# Patient Record
Sex: FEMALE | Race: WHITE | Employment: OTHER | URBAN - METROPOLITAN AREA
[De-identification: names, ages, dates, MRNs, and addresses within clinical notes are randomized per-mention and may not be internally consistent; named-entity substitution may affect disease eponyms.]

---

## 2016-10-03 LAB — LDL-C, EXTERNAL: 98.4

## 2017-01-01 ENCOUNTER — HOSPITAL ENCOUNTER (OUTPATIENT)
Dept: MAMMOGRAPHY | Age: 72
Discharge: HOME | End: 2017-12-13
Attending: INTERNAL MEDICINE
Payer: MEDICARE

## 2017-01-01 ENCOUNTER — OFFICE VISIT (OUTPATIENT)
Dept: CARDIOLOGY CLINIC | Age: 72
End: 2017-01-01

## 2017-01-01 ENCOUNTER — OFFICE VISIT (OUTPATIENT)
Dept: INTERNAL MEDICINE CLINIC | Age: 72
End: 2017-01-01

## 2017-01-01 ENCOUNTER — TELEPHONE (OUTPATIENT)
Dept: INTERNAL MEDICINE CLINIC | Age: 72
End: 2017-01-01

## 2017-01-01 ENCOUNTER — HOSPITAL ENCOUNTER (OUTPATIENT)
Dept: LAB | Age: 72
Discharge: HOME | End: 2017-11-17
Payer: MEDICARE

## 2017-01-01 ENCOUNTER — HOSPITAL ENCOUNTER (OUTPATIENT)
Dept: LAB | Age: 72
Discharge: HOME | End: 2017-11-20
Payer: MEDICARE

## 2017-01-01 ENCOUNTER — HOSPITAL ENCOUNTER (OUTPATIENT)
Dept: ULTRASOUND IMAGING | Age: 72
Discharge: HOME | End: 2017-11-27
Attending: INTERNAL MEDICINE
Payer: MEDICARE

## 2017-01-01 VITALS
OXYGEN SATURATION: 100 % | DIASTOLIC BLOOD PRESSURE: 56 MMHG | WEIGHT: 119.6 LBS | BODY MASS INDEX: 21.19 KG/M2 | HEIGHT: 63 IN | HEART RATE: 64 BPM | RESPIRATION RATE: 12 BRPM | TEMPERATURE: 98.1 F | SYSTOLIC BLOOD PRESSURE: 124 MMHG

## 2017-01-01 VITALS
RESPIRATION RATE: 12 BRPM | DIASTOLIC BLOOD PRESSURE: 68 MMHG | HEART RATE: 61 BPM | BODY MASS INDEX: 21.16 KG/M2 | OXYGEN SATURATION: 96 % | SYSTOLIC BLOOD PRESSURE: 135 MMHG | WEIGHT: 119.4 LBS | HEIGHT: 63 IN | TEMPERATURE: 96.9 F

## 2017-01-01 VITALS
SYSTOLIC BLOOD PRESSURE: 138 MMHG | DIASTOLIC BLOOD PRESSURE: 64 MMHG | HEART RATE: 70 BPM | BODY MASS INDEX: 21.44 KG/M2 | HEIGHT: 63 IN | WEIGHT: 121 LBS | OXYGEN SATURATION: 98 %

## 2017-01-01 DIAGNOSIS — N17.9 AKI (ACUTE KIDNEY INJURY) (HCC): ICD-10-CM

## 2017-01-01 DIAGNOSIS — I48.3 TYPICAL ATRIAL FLUTTER (HCC): Primary | ICD-10-CM

## 2017-01-01 DIAGNOSIS — M35.00 SJOGREN'S SYNDROME, WITH UNSPECIFIED ORGAN INVOLVEMENT (HCC): ICD-10-CM

## 2017-01-01 DIAGNOSIS — Z87.448 HISTORY OF HEMATURIA: ICD-10-CM

## 2017-01-01 DIAGNOSIS — J20.9 ACUTE BRONCHITIS, UNSPECIFIED ORGANISM: Primary | ICD-10-CM

## 2017-01-01 DIAGNOSIS — R80.9 MICROALBUMINURIA: ICD-10-CM

## 2017-01-01 DIAGNOSIS — K75.4 AUTOIMMUNE HEPATITIS (HCC): ICD-10-CM

## 2017-01-01 DIAGNOSIS — R80.9 MICROALBUMINURIA: Primary | ICD-10-CM

## 2017-01-01 DIAGNOSIS — I48.0 PAROXYSMAL ATRIAL FIBRILLATION (HCC): ICD-10-CM

## 2017-01-01 DIAGNOSIS — Z12.39 BREAST CANCER SCREENING: ICD-10-CM

## 2017-01-01 DIAGNOSIS — M35.1 MIXED CONNECTIVE TISSUE DISEASE (HCC): ICD-10-CM

## 2017-01-01 DIAGNOSIS — R76.8 POSITIVE HEPATITIS C ANTIBODY TEST: ICD-10-CM

## 2017-01-01 LAB
ALBUMIN SERPL-MCNC: 4.3 G/DL (ref 3.4–5)
ALBUMIN/GLOB SERPL: 1.2 {RATIO} (ref 0.8–1.7)
ALP SERPL-CCNC: 60 U/L (ref 45–117)
ALT SERPL-CCNC: 39 U/L (ref 13–56)
ANION GAP SERPL CALC-SCNC: 8 MMOL/L (ref 3–18)
AST SERPL-CCNC: 37 U/L (ref 15–37)
BASOPHILS # BLD: 0 K/UL (ref 0–0.06)
BASOPHILS NFR BLD: 0 % (ref 0–2)
BILIRUB SERPL-MCNC: 0.5 MG/DL (ref 0.2–1)
BUN SERPL-MCNC: 12 MG/DL (ref 7–18)
BUN/CREAT SERPL: 16 (ref 12–20)
CALCIUM SERPL-MCNC: 9.5 MG/DL (ref 8.5–10.1)
CCP IGA+IGG SERPL IA-ACNC: 8 UNITS (ref 0–19)
CENTROMERE B AB SER-ACNC: <0.2 AI (ref 0–0.9)
CHLORIDE SERPL-SCNC: 104 MMOL/L (ref 100–108)
CHROMATIN AB SERPL-ACNC: <0.2 AI (ref 0–0.9)
CO2 SERPL-SCNC: 28 MMOL/L (ref 21–32)
COLLECT DURATION TIME UR: 24 HR
CREAT SERPL-MCNC: 0.73 MG/DL (ref 0.6–1.3)
CRP SERPL-MCNC: <0.3 MG/DL (ref 0–0.3)
DIFFERENTIAL METHOD BLD: ABNORMAL
DSDNA AB SER-ACNC: <1 IU/ML (ref 0–9)
DSDNA AB SER-ACNC: <1 IU/ML (ref 0–9)
ENA JO1 AB SER-ACNC: <0.2 AI (ref 0–0.9)
ENA RNP AB SER-ACNC: 1.6 AI (ref 0–0.9)
ENA RNP AB SER-ACNC: 2.2 AI (ref 0–0.9)
ENA SCL70 AB SER-ACNC: <0.2 AI (ref 0–0.9)
ENA SM AB SER-ACNC: <0.2 AI (ref 0–0.9)
ENA SS-A AB SER-ACNC: 7.8 AI (ref 0–0.9)
ENA SS-B AB SER-ACNC: <0.2 AI (ref 0–0.9)
EOSINOPHIL # BLD: 0.1 K/UL (ref 0–0.4)
EOSINOPHIL NFR BLD: 2 % (ref 0–5)
ERYTHROCYTE [DISTWIDTH] IN BLOOD BY AUTOMATED COUNT: 14.9 % (ref 11.6–14.5)
GLOBULIN SER CALC-MCNC: 3.7 G/DL (ref 2–4)
GLUCOSE SERPL-MCNC: 86 MG/DL (ref 74–99)
HBV SURFACE AG SER QL: <0.1 INDEX
HBV SURFACE AG SER QL: NEGATIVE
HCT VFR BLD AUTO: 40.6 % (ref 35–45)
HCV AB S/CO SERPL IA: >11 S/CO RATIO (ref 0–0.9)
HCV RNA SERPL QL NAA+PROBE: NEGATIVE
HCV RNA SERPL QL NAA+PROBE: NORMAL
HGB BLD-MCNC: 13.1 G/DL (ref 12–16)
LYMPHOCYTES # BLD: 1.2 K/UL (ref 0.9–3.6)
LYMPHOCYTES NFR BLD: 27 % (ref 21–52)
MCH RBC QN AUTO: 29.9 PG (ref 24–34)
MCHC RBC AUTO-ENTMCNC: 32.3 G/DL (ref 31–37)
MCV RBC AUTO: 92.7 FL (ref 74–97)
MONOCYTES # BLD: 0.4 K/UL (ref 0.05–1.2)
MONOCYTES NFR BLD: 9 % (ref 3–10)
NEUTS SEG # BLD: 2.8 K/UL (ref 1.8–8)
NEUTS SEG NFR BLD: 62 % (ref 40–73)
PLATELET # BLD AUTO: 221 K/UL (ref 135–420)
PMV BLD AUTO: 11.8 FL (ref 9.2–11.8)
POTASSIUM SERPL-SCNC: 4 MMOL/L (ref 3.5–5.5)
PROT 24H UR-MRATE: NORMAL MG/24HR
PROT SERPL-MCNC: 8 G/DL (ref 6.4–8.2)
RBC # BLD AUTO: 4.38 M/UL (ref 4.2–5.3)
S PYO AG THROAT QL: NEGATIVE
SEE BELOW, 164869: ABNORMAL
SODIUM SERPL-SCNC: 140 MMOL/L (ref 136–145)
SPECIMEN VOL ?TM UR: 1580 ML
VALID INTERNAL CONTROL?: YES
WBC # BLD AUTO: 4.6 K/UL (ref 4.6–13.2)

## 2017-01-01 PROCEDURE — 86235 NUCLEAR ANTIGEN ANTIBODY: CPT | Performed by: INTERNAL MEDICINE

## 2017-01-01 PROCEDURE — 86200 CCP ANTIBODY: CPT | Performed by: INTERNAL MEDICINE

## 2017-01-01 PROCEDURE — 86225 DNA ANTIBODY NATIVE: CPT | Performed by: INTERNAL MEDICINE

## 2017-01-01 PROCEDURE — 86140 C-REACTIVE PROTEIN: CPT | Performed by: INTERNAL MEDICINE

## 2017-01-01 PROCEDURE — 80053 COMPREHEN METABOLIC PANEL: CPT | Performed by: INTERNAL MEDICINE

## 2017-01-01 PROCEDURE — 84156 ASSAY OF PROTEIN URINE: CPT | Performed by: INTERNAL MEDICINE

## 2017-01-01 PROCEDURE — 86803 HEPATITIS C AB TEST: CPT | Performed by: INTERNAL MEDICINE

## 2017-01-01 PROCEDURE — 77067 SCR MAMMO BI INCL CAD: CPT

## 2017-01-01 PROCEDURE — 87521 HEPATITIS C PROBE&RVRS TRNSC: CPT | Performed by: INTERNAL MEDICINE

## 2017-01-01 PROCEDURE — 85025 COMPLETE CBC W/AUTO DIFF WBC: CPT | Performed by: INTERNAL MEDICINE

## 2017-01-01 PROCEDURE — 36415 COLL VENOUS BLD VENIPUNCTURE: CPT | Performed by: INTERNAL MEDICINE

## 2017-01-01 PROCEDURE — 87340 HEPATITIS B SURFACE AG IA: CPT | Performed by: INTERNAL MEDICINE

## 2017-01-01 PROCEDURE — 77063 BREAST TOMOSYNTHESIS BI: CPT

## 2017-01-01 PROCEDURE — 76770 US EXAM ABDO BACK WALL COMP: CPT

## 2017-01-01 RX ORDER — AZITHROMYCIN 250 MG/1
TABLET, FILM COATED ORAL
Qty: 6 TAB | Refills: 0 | Status: SHIPPED | OUTPATIENT
Start: 2017-01-01 | End: 2018-01-01 | Stop reason: ALTCHOICE

## 2017-01-01 RX ORDER — CODEINE PHOSPHATE AND GUAIFENESIN 10; 100 MG/5ML; MG/5ML
5 SOLUTION ORAL
COMMUNITY
End: 2018-01-01 | Stop reason: ALTCHOICE

## 2017-01-07 ENCOUNTER — NURSE NAVIGATOR (OUTPATIENT)
Dept: INTERNAL MEDICINE CLINIC | Age: 72
End: 2017-01-07

## 2017-01-07 NOTE — PROGRESS NOTES
Episode closed:   Patient admitted to [address] [place] [address], 12/4/16 to 12/4/16 for A-fib with tachycardic ventricular rate. No hospitalization or ED admission post 30 days from discharge of 12/4/16.

## 2017-01-13 ENCOUNTER — HOSPITAL ENCOUNTER (OUTPATIENT)
Dept: LAB | Age: 72
Discharge: HOME | End: 2017-01-13
Payer: MEDICARE

## 2017-01-13 LAB
ALBUMIN SERPL BCP-MCNC: 4 G/DL (ref 3.4–5)
ALBUMIN/GLOB SERPL: 1.2 {RATIO} (ref 0.8–1.7)
ALP SERPL-CCNC: 66 U/L (ref 45–117)
ALT SERPL-CCNC: 42 U/L (ref 13–56)
ANION GAP BLD CALC-SCNC: 9 MMOL/L (ref 3–18)
AST SERPL W P-5'-P-CCNC: 36 U/L (ref 15–37)
BASOPHILS # BLD AUTO: 0 K/UL (ref 0–0.06)
BASOPHILS # BLD: 1 % (ref 0–2)
BILIRUB SERPL-MCNC: 0.5 MG/DL (ref 0.2–1)
BUN SERPL-MCNC: 12 MG/DL (ref 7–18)
BUN/CREAT SERPL: 16 (ref 12–20)
CALCIUM SERPL-MCNC: 9.3 MG/DL (ref 8.5–10.1)
CHLORIDE SERPL-SCNC: 105 MMOL/L (ref 100–108)
CO2 SERPL-SCNC: 28 MMOL/L (ref 21–32)
CREAT SERPL-MCNC: 0.76 MG/DL (ref 0.6–1.3)
DIFFERENTIAL METHOD BLD: NORMAL
EOSINOPHIL # BLD: 0.1 K/UL (ref 0–0.4)
EOSINOPHIL NFR BLD: 2 % (ref 0–5)
ERYTHROCYTE [DISTWIDTH] IN BLOOD BY AUTOMATED COUNT: 14.3 % (ref 11.6–14.5)
GLOBULIN SER CALC-MCNC: 3.3 G/DL (ref 2–4)
GLUCOSE SERPL-MCNC: 88 MG/DL (ref 74–99)
HCT VFR BLD AUTO: 39.3 % (ref 35–45)
HGB BLD-MCNC: 13 G/DL (ref 12–16)
LYMPHOCYTES # BLD AUTO: 21 % (ref 21–52)
LYMPHOCYTES # BLD: 1.3 K/UL (ref 0.9–3.6)
MCH RBC QN AUTO: 30.2 PG (ref 24–34)
MCHC RBC AUTO-ENTMCNC: 33.1 G/DL (ref 31–37)
MCV RBC AUTO: 91.4 FL (ref 74–97)
MONOCYTES # BLD: 0.5 K/UL (ref 0.05–1.2)
MONOCYTES NFR BLD AUTO: 9 % (ref 3–10)
NEUTS SEG # BLD: 4.1 K/UL (ref 1.8–8)
NEUTS SEG NFR BLD AUTO: 67 % (ref 40–73)
PLATELET # BLD AUTO: 186 K/UL (ref 135–420)
PMV BLD AUTO: 11.4 FL (ref 9.2–11.8)
POTASSIUM SERPL-SCNC: 4.3 MMOL/L (ref 3.5–5.5)
PROT SERPL-MCNC: 7.3 G/DL (ref 6.4–8.2)
RBC # BLD AUTO: 4.3 M/UL (ref 4.2–5.3)
SODIUM SERPL-SCNC: 142 MMOL/L (ref 136–145)
TSH SERPL DL<=0.05 MIU/L-ACNC: 0.81 UIU/ML (ref 0.36–3.74)
WBC # BLD AUTO: 6.1 K/UL (ref 4.6–13.2)

## 2017-01-13 PROCEDURE — 84443 ASSAY THYROID STIM HORMONE: CPT | Performed by: INTERNAL MEDICINE

## 2017-01-13 PROCEDURE — 85025 COMPLETE CBC W/AUTO DIFF WBC: CPT | Performed by: INTERNAL MEDICINE

## 2017-01-13 PROCEDURE — 36415 COLL VENOUS BLD VENIPUNCTURE: CPT | Performed by: INTERNAL MEDICINE

## 2017-01-13 PROCEDURE — 80053 COMPREHEN METABOLIC PANEL: CPT | Performed by: INTERNAL MEDICINE

## 2017-01-18 ENCOUNTER — OFFICE VISIT (OUTPATIENT)
Dept: INTERNAL MEDICINE CLINIC | Age: 72
End: 2017-01-18

## 2017-01-18 VITALS
BODY MASS INDEX: 20.62 KG/M2 | OXYGEN SATURATION: 97 % | DIASTOLIC BLOOD PRESSURE: 68 MMHG | RESPIRATION RATE: 12 BRPM | WEIGHT: 116.4 LBS | HEIGHT: 63 IN | HEART RATE: 78 BPM | SYSTOLIC BLOOD PRESSURE: 140 MMHG | TEMPERATURE: 98 F

## 2017-01-18 DIAGNOSIS — I48.3 TYPICAL ATRIAL FLUTTER (HCC): ICD-10-CM

## 2017-01-18 DIAGNOSIS — R31.9 HEMATURIA: ICD-10-CM

## 2017-01-18 DIAGNOSIS — G51.9 FACIAL NEUROPATHY: Primary | ICD-10-CM

## 2017-01-18 DIAGNOSIS — F43.9 STRESS: ICD-10-CM

## 2017-01-18 DIAGNOSIS — K75.4 AUTOIMMUNE HEPATITIS (HCC): ICD-10-CM

## 2017-01-18 NOTE — PATIENT INSTRUCTIONS
Lab Results   Component Value Date/Time    Sodium 142 01/13/2017 11:47 AM    Potassium 4.3 01/13/2017 11:47 AM    Chloride 105 01/13/2017 11:47 AM    CO2 28 01/13/2017 11:47 AM    Anion gap 9 01/13/2017 11:47 AM    Glucose 88 01/13/2017 11:47 AM    BUN 12 01/13/2017 11:47 AM    Creatinine 0.76 01/13/2017 11:47 AM    BUN/Creatinine ratio 16 01/13/2017 11:47 AM    GFR est AA >60 01/13/2017 11:47 AM    GFR est non-AA >60 01/13/2017 11:47 AM    Calcium 9.3 01/13/2017 11:47 AM    Bilirubin, total 0.5 01/13/2017 11:47 AM    ALT 42 01/13/2017 11:47 AM    AST 36 01/13/2017 11:47 AM    Alk. phosphatase 66 01/13/2017 11:47 AM    Protein, total 7.3 01/13/2017 11:47 AM    Albumin 4.0 01/13/2017 11:47 AM    Globulin 3.3 01/13/2017 11:47 AM    A-G Ratio 1.2 01/13/2017 11:47 AM     Lab Results   Component Value Date/Time    WBC 6.1 01/13/2017 11:47 AM    HGB 13.0 01/13/2017 11:47 AM    HCT 39.3 01/13/2017 11:47 AM    PLATELET 451 01/89/2256 11:47 AM    MCV 91.4 01/13/2017 11:47 AM         Results for Marii Painting (MRN [id number]) as of 1/18/2017 11:24   Ref. Range 1/13/2017 11:47   TSH Latest Ref Range: 0.36 - 3.74 uIU/mL 0.81        Autoimmune Hepatitis: Care Instructions  Your Care Instructions    Autoimmune hepatitis is a long-term disease that makes the body's defenses (immune system) attack the liver. This causes liver inflammation and damage. Sometimes chemicals, certain medicines, or a virus can cause cells in your body to attack your liver. Some people appear to be more likely to get this disease. And women get it more often than men. It can cause tiredness, belly discomfort, and itchy skin. You may also have diarrhea and fluid buildup in your belly (ascites). Your skin and eyes may look yellow. This is called jaundice. And you may not want to eat, so you may lose weight. But there are medicines you can take to keep your liver damage from getting worse. Follow-up care is a key part of your treatment and safety.  Be sure to make and go to all appointments, and call your doctor if you are having problems. It's also a good idea to know your test results and keep a list of the medicines you take. How can you care for yourself at home? · Be safe with medicines. Take your medicines exactly as prescribed. Call your doctor if you have any problems with your medicine. You will get more details on the specific medicines your doctor prescribes. · Lower your activity to match your energy. · Avoid alcohol for as long as your doctor tells you to. Tell your doctor if you need help to quit. Counseling, support groups, and sometimes medicines can help you stay sober. · Make sure your doctor knows all the medicines you take. Some medicines, such as acetaminophen (Tylenol), can make liver problems worse. Do not take any new medicines, and do not stop taking prescribed medicines, unless your doctor says it is okay. · Follow your doctor's instructions about your diet. You may need a low-salt diet. Salt is in many prepared foods, such as shafer, canned foods, snack foods, sauces, and soups. Look for reduced-salt products. · If you have itchy skin, keep cool and stay out of the sun. It may help to wear cotton clothing. Talk to your doctor about using over-the-counter medicines, such as diphenhydramine (Benadryl) or loratadine (Claritin), to control the itching. Read and follow all instructions on the label. When should you call for help? Call 911 anytime you think you may need emergency care. For example, call if:  · You passed out (lost consciousness). · You have severe belly pain or swelling. · You have severe problems breathing. · You vomit blood. Call your doctor now or seek immediate medical care if:  · You are confused, or your confusion gets worse. · You have a fever or chills. · Your skin or the whites of your eyes turn yellow or get more yellow. · You have belly pain or swelling. · You vomit or have severe nausea.   · Your urine is dark yellow-brown, or your stools are light-colored. · Your stools are black and look like tar, or have streaks of blood. Watch closely for changes in your health, and be sure to contact your doctor if:  · Your skin itches, or itching gets worse. · You are not able to eat well and are losing weight. · You feel more tired than usual.  Where can you learn more? Go to http://pratik-niki.info/. Enter U616 in the search box to learn more about \"Autoimmune Hepatitis: Care Instructions. \"  Current as of: May 24, 2016  Content Version: 11.1  © 7216-0984 [employer]. Care instructions adapted under license by [employer] (which disclaims liability or warranty for this information). If you have questions about a medical condition or this instruction, always ask your healthcare professional. [address] any warranty or liability for your use of this information.

## 2017-01-18 NOTE — MR AVS SNAPSHOT
Visit Information Date & Time Provider Department Dept. Phone Encounter #  
 1/18/2017 11:00 AM Anna Green MD Internist of [facility] [id number] Follow-up Instructions Return in about 6 months (around 7/18/2017) for BP check. Your Appointments 3/15/2017  9:40 AM  
Follow Up with Ramses Nuñez MD  
[facility] [place] [address] ([address]) Appt Note: Follow up in 3 mos [facility] [address] [address] [address] [phone] [place]  
  
    
 11/8/2017  8:30 AM  
Office Visit with Ezequiel Back NP Internist of [facility] ([facility]) Appt Note: ;   
 [phone] [place], [address] [address] [address] [place]  
  
   
 [address], [address] Upcoming Health Maintenance Date Due  
 GLAUCOMA SCREENING Q2Y 1/5/2017 Pneumococcal 65+ Low/Medium Risk (2 of 2 - PPSV23) 10/5/2017 MEDICARE YEARLY EXAM 11/8/2017 BREAST CANCER SCRN MAMMOGRAM 12/12/2017 COLONOSCOPY 9/24/2018 DTaP/Tdap/Td series (2 - Td) 10/5/2026 Allergies as of 1/18/2017  Review Complete On: 12/9/2016 By: Ramses Nuñez MD  
  
 Severity Noted Reaction Type Reactions Adhesive High 09/18/2013    Hives Mercurochrome [Merbromin] High 09/18/2013    Rash Merthiolate (Thimerosal) High 09/18/2013    Rash Nasacort [Triamcinolone Acetonide] High 09/18/2013    Other (comments)  
 congestion Pcn [Penicillins] Medium 09/18/2013    Rash Current Immunizations  Reviewed on 8/2/2016 No immunizations on file. Not reviewed this visit You Were Diagnosed With   
  
 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  
 20.62 kg/m 2 1.53 m 2 Preferred Pharmacy Pharmacy Name Phone [address] [phone]  [place] [phone] Your Updated Medication List  
  
   
This list is accurate as of: 1/18/17 11:50 AM.  Always use your most recent med list.  
  
  
  
  
 calcium 500 mg Tab Take 500 mg by mouth. BID FLEXERIL 10 mg tablet Generic drug:  cyclobenzaprine Take 10 mg by mouth daily as needed. 1 tab at bedtime  Indications: FIBROMYALGIA  
  
 metoprolol succinate 25 mg XL tablet Commonly known as:  TOPROL-XL Take 1 Tab by mouth two (2) times a day. metoprolol tartrate 25 mg tablet Commonly known as:  LOPRESSOR  
1 tablet daily as needed  
  
 multivitamin tablet Commonly known as:  ONE A DAY Take 1 Tab by mouth daily. OMEGA 3 PO Take  by mouth daily as needed. RESTASIS 0.05 % ophthalmic emulsion Generic drug:  cycloSPORINE Administer 1 drop to both eyes daily. Follow-up Instructions Return in about 6 months (around 7/18/2017) for BP check. Patient Instructions Lab Results Component Value Date/Time Sodium 142 01/13/2017 11:47 AM  
 Potassium 4.3 01/13/2017 11:47 AM  
 Chloride 105 01/13/2017 11:47 AM  
 CO2 28 01/13/2017 11:47 AM  
 Anion gap 9 01/13/2017 11:47 AM  
 Glucose 88 01/13/2017 11:47 AM  
 BUN 12 01/13/2017 11:47 AM  
 Creatinine 0.76 01/13/2017 11:47 AM  
 BUN/Creatinine ratio 16 01/13/2017 11:47 AM  
 GFR est AA >60 01/13/2017 11:47 AM  
 GFR est non-AA >60 01/13/2017 11:47 AM  
 Calcium 9.3 01/13/2017 11:47 AM  
 Bilirubin, total 0.5 01/13/2017 11:47 AM  
 ALT 42 01/13/2017 11:47 AM  
 AST 36 01/13/2017 11:47 AM  
 Alk.  phosphatase 66 01/13/2017 11:47 AM  
 Protein, total 7.3 01/13/2017 11:47 AM  
 Albumin 4.0 01/13/2017 11:47 AM  
 Globulin 3.3 01/13/2017 11:47 AM  
 A-G Ratio 1.2 01/13/2017 11:47 AM  
 
Lab Results Component Value Date/Time WBC 6.1 01/13/2017 11:47 AM  
 HGB 13.0 01/13/2017 11:47 AM  
 HCT 39.3 01/13/2017 11:47 AM  
 PLATELET 537 25/63/5628 11:47 AM  
 MCV 91.4 01/13/2017 11:47 AM  
 
 
 
Results for Ken Aguilar (MRN [id number]) as of 1/18/2017 11:24 Ref. Range 1/13/2017 11:47  
TSH Latest Ref Range: 0.36 - 3.74 uIU/mL 0.81 Autoimmune Hepatitis: Care Instructions Your Care Instructions Autoimmune hepatitis is a long-term disease that makes the body's defenses (immune system) attack the liver. This causes liver inflammation and damage. Sometimes chemicals, certain medicines, or a virus can cause cells in your body to attack your liver. Some people appear to be more likely to get this disease. And women get it more often than men. It can cause tiredness, belly discomfort, and itchy skin. You may also have diarrhea and fluid buildup in your belly (ascites). Your skin and eyes may look yellow. This is called jaundice. And you may not want to eat, so you may lose weight. But there are medicines you can take to keep your liver damage from getting worse. Follow-up care is a key part of your treatment and safety. Be sure to make and go to all appointments, and call your doctor if you are having problems. It's also a good idea to know your test results and keep a list of the medicines you take. How can you care for yourself at home? · Be safe with medicines. Take your medicines exactly as prescribed. Call your doctor if you have any problems with your medicine. You will get more details on the specific medicines your doctor prescribes. · Lower your activity to match your energy. · Avoid alcohol for as long as your doctor tells you to. Tell your doctor if you need help to quit.  Counseling, support groups, and sometimes medicines can help you stay sober. · Make sure your doctor knows all the medicines you take. Some medicines, such as acetaminophen (Tylenol), can make liver problems worse. Do not take any new medicines, and do not stop taking prescribed medicines, unless your doctor says it is okay. · Follow your doctor's instructions about your diet. You may need a low-salt diet. Salt is in many prepared foods, such as shafer, canned foods, snack foods, sauces, and soups. Look for reduced-salt products. · If you have itchy skin, keep cool and stay out of the sun. It may help to wear cotton clothing. Talk to your doctor about using over-the-counter medicines, such as diphenhydramine (Benadryl) or loratadine (Claritin), to control the itching. Read and follow all instructions on the label. When should you call for help? Call 911 anytime you think you may need emergency care. For example, call if: 
· You passed out (lost consciousness). · You have severe belly pain or swelling. · You have severe problems breathing. · You vomit blood. Call your doctor now or seek immediate medical care if: 
· You are confused, or your confusion gets worse. · You have a fever or chills. · Your skin or the whites of your eyes turn yellow or get more yellow. · You have belly pain or swelling. · You vomit or have severe nausea. · Your urine is dark yellow-brown, or your stools are light-colored. · Your stools are black and look like tar, or have streaks of blood. Watch closely for changes in your health, and be sure to contact your doctor if: 
· Your skin itches, or itching gets worse. · You are not able to eat well and are losing weight. · You feel more tired than usual. 
Where can you learn more? Go to http://pratik-niki.info/. Enter B039 in the search box to learn more about \"Autoimmune Hepatitis: Care Instructions. \" Current as of: May 24, 2016 Content Version: 11.1 © 3276-1573 [employer]. Care instructions adapted under license by [employer] (which disclaims liability or warranty for this information). If you have questions about a medical condition or this instruction, always ask your healthcare professional. [address] any warranty or liability for your use of this information. Introducing [facility]! Saran Campos introduces Thumbtack patient portal. Now you can access parts of your medical record, email your doctor's office, and request medication refills online. 1. In your internet browser, go to [email or web address] 360 2. Click on the First Time User? Click Here link in the Sign In box. You will see the New Member Sign Up page. 3. Enter your Thumbtack Access Code exactly as it appears below. You will not need to use this code after youve completed the sign-up process. If you do not sign up before the expiration date, you must request a new code. · Thumbtack Access Code: [email or web address] Expires: 4/10/2017 11:48 AM 
 
4. Enter the last four digits of your Social Security Number (xxxx) and Date of Birth (mm/dd/yyyy) as indicated and click Submit. You will be taken to the next sign-up page. 5. Create a Thumbtack ID. This will be your Thumbtack login ID and cannot be changed, so think of one that is secure and easy to remember. 6. Create a Thumbtack password. You can change your password at any time. 7. Enter your Password Reset Question and Answer. This can be used at a later time if you forget your password. 8. Enter your e-mail address. You will receive e-mail notification when new information is available in [address] 9. Click Sign Up. You can now view and download portions of your medical record. 10. Click the Download Summary menu link to download a portable copy of your medical information.  
 
If you have questions, please visit the Frequently Asked Questions section of the Scout. Remember, [employer] is NOT to be used for urgent needs. For medical emergencies, dial 911. Now available from your iPhone and Android! Please provide this summary of care documentation to your next provider. Your primary care clinician is listed as Layne Cooks. If you have any questions after today's visit, please call [phone].

## 2017-01-19 PROBLEM — F43.9 STRESS: Status: ACTIVE | Noted: 2017-01-19

## 2017-01-19 NOTE — PROGRESS NOTES
INTERNISTS [facility]:  2017, MRN: [id number]      Siva Whipple is a 70 y.o. female and presents to clinic for Palpitations (Patient here for heart flutter and some SOB off and on. room 3  )    Subjective:   Pt is a 79yo female with h/o sjogren's syndrome per EHR, autoimmune hepatitis, hematuria, and atrial flutter. 1. Autoimmune Hepatitis:   - Off rx as this condition is stable   - followed by GI team   - No abdominal pain or hematochezia/melena    2. Hematuria hx:   - Denies all urinary sx   - Followed by Urology team    3. Atrial Flutter:    - On metoprolol extended release but told by Cardiology team that she can take short acting addition metoprolol if she develops palpitations   - Pt has h/o palpitations off/on   - Latest labs show an unremarkable TSH   - Followed by Cardiology team    4. Stress:  - Pt's   within the past month  - \"He went to a better place. \"    - She is [place] and notes that her ana has helped her to find peace with her 's death   - She has a PHQ2 score of 2   - She is interested in pursuing talk therapy    5. Facial neuropathy:   - Pt has >1 yr h/o facial paresthesias off/on   - She has seen Neurology team in the past   - A CT of her head was obtained about 1 year ago and revealed no new abnormalities   - She has old findings that suggest a frontal lobe infarct (small)   - Pt denies facial paresthesias today but notes that when she has them, that they occur along the left side of her face   - No vision/hearing changes   - She has h/o ?vertigo and per pt hx, she has h/o otosclerosis.     - A head CT was ordered at her last apt but pt never had this done      Patient Active Problem List    Diagnosis Date Noted    Sjogren's syndrome ([facility] 10/05/2016    Advanced care planning/counseling discussion 2015    Autoimmune hepatitis ([facility] 2015    Hematuria 2014    Hydronephrosis 2014    Atrial flutter ([facility] 10/28/2014    Chest pain 10/28/2014    Elevated liver function tests 10/28/2014       Current Outpatient Prescriptions   Medication Sig Dispense Refill    metoprolol tartrate (LOPRESSOR) 25 mg tablet  1 tablet daily as needed 30 Tab 5    metoprolol succinate (TOPROL-XL) 25 mg XL tablet Take 1 Tab by mouth two (2) times a day. 60 Tab 6    cyclobenzaprine (FLEXERIL) 10 mg tablet Take 10 mg by mouth daily as needed. 1 tab at bedtime  Indications: FIBROMYALGIA      calcium 500 mg Tab Take 500 mg by mouth. BID      multivitamin (ONE A DAY) tablet Take 1 Tab by mouth daily.  cycloSPORINE (RESTASIS) 0.05 % ophthalmic emulsion Administer 1 drop to both eyes daily.  FLAXSEED OIL (OMEGA 3 PO) Take  by mouth daily as needed. Allergies   Allergen Reactions    Adhesive Hives    Mercurochrome [Merbromin] Rash    Merthiolate (Thimerosal) Rash    Nasacort [Triamcinolone Acetonide] Other (comments)     congestion    Pcn [Penicillins] Rash       Past Medical History   Diagnosis Date    Abdominal pain     Autoimmune hepatitis ([facility] 4-24-15     Dr Indu Hartmann, s/p liver biopsy    Basal cell cancer     BPPV (benign paroxysmal positional vertigo)      Dr Megan Godfrey Cardiac echocardiogram 11/11/2014     EF 50-55%. No WMA. Gr 1 DDfx. RVSP 26 mmHg.       Chronic lung disease     Degenerative disc disease, lumbar     Enlarged lymph nodes      celiac artery-follow up CT showed improvement in MAY 2015    Fracture     GERD (gastroesophageal reflux disease)     HCV (hepatitis C virus)     Hematuria      gross with xarelto-Dr Tom Holden, Dr Fernanda Martinez: Oncology-Hematology-[facility]    Hemorrhage 1962 and 1963    Hepatitis C      treated    Hepatitis C 2002    Liver disease     Lumbar spondylosis with myelopathy     Ocular migraine     Osteoarthritis     Osteoporosis     Otosclerosis      Bilateral    Pinched nerve [id number]     C5 and C6    Scoliosis     Sjogren's syndrome ([facility] 1999    SVT (supraventricular tachycardia) 10-28-14    Tachycardia     Transient global amnesia 9/4/07/, 4/12/12     x2    Wedging of vertebra ([facility]      Dry needling completed       Past Surgical History   Procedure Laterality Date    Hx appendectomy      Hx tonsillectomy      Hx adenoidectomy      Hx cyst incision and drainage Bilateral 1973    Hx colonoscopy  9/2013       Family History   Problem Relation Age of Onset    Cancer Mother 76     stomach    Stroke Maternal Grandmother     Heart Disease Brother     Cancer Brother      Lyphoma    Stroke Father     Arthritis-osteo Sister     Osteoporosis Sister     No Known Problems Maternal Grandfather     Osteoporosis Paternal Grandmother     Osteoporosis Paternal Grandfather     Diabetes Brother        Social History   Substance Use Topics    Smoking status: Former Smoker     Packs/day: 1.50     Years: 10.00     Types: Cigarettes     Quit date: 9/18/1973    Smokeless tobacco: Never Used    Alcohol use No       ROS   Review of Systems   Constitutional: Negative for chills and fever. HENT: Negative for ear pain and sore throat. Eyes: Negative for blurred vision and pain. Respiratory: Negative for cough and shortness of breath. Cardiovascular: Negative for chest pain. Gastrointestinal: Negative for abdominal pain. Genitourinary: Negative for dysuria. Musculoskeletal: Negative for joint pain and myalgias. Skin: Negative for rash. Neurological: Negative for tingling (has h/o paresthesias along her right face but denies sx now), focal weakness and headaches. Endo/Heme/Allergies: Positive for environmental allergies. Does not bruise/bleed easily. Psychiatric/Behavioral: Positive for depression. Negative for substance abuse and suicidal ideas.        Objective     Vitals:    01/18/17 1058   BP: 140/68   Pulse: 78   Resp: 12   Temp: 98 °F (36.7 °C)   SpO2: 97%   Weight: 116 lb 6.4 oz (52.8 kg)   Height: 5' 3\" (1.6 m)   PainSc:   0 - No pain       Physical Exam Constitutional: She is oriented to person, place, and time and well-developed, well-nourished, and in no distress. HENT:   Head: Normocephalic and atraumatic. Right Ear: External ear normal.   Left Ear: External ear normal.   Nose: Nose normal.   Mouth/Throat: Oropharynx is clear and moist. No oropharyngeal exudate. Eyes: Conjunctivae and EOM are normal. Pupils are equal, round, and reactive to light. Right eye exhibits no discharge. Left eye exhibits no discharge. No scleral icterus. Neck: Neck supple. Cardiovascular: Normal rate, normal heart sounds and intact distal pulses. Exam reveals no gallop and no friction rub. No murmur heard. Pulmonary/Chest: Effort normal and breath sounds normal. No respiratory distress. She has no wheezes. She has no rales. Abdominal: Soft. Bowel sounds are normal. She exhibits no distension. There is no tenderness. There is no rebound and no guarding. No hepatomegaly   Musculoskeletal: She exhibits no edema or tenderness (BUE are NTTP). Lymphadenopathy:     She has no cervical adenopathy. Neurological: She is alert and oriented to person, place, and time. She exhibits normal muscle tone. Gait normal.   Skin: Skin is warm and dry. No erythema. Psychiatric: Affect normal.   Nursing note and vitals reviewed.       LABS   Data Review:   Lab Results   Component Value Date/Time    WBC 6.1 01/13/2017 11:47 AM    HGB 13.0 01/13/2017 11:47 AM    HCT 39.3 01/13/2017 11:47 AM    PLATELET 440 10/92/3097 11:47 AM    MCV 91.4 01/13/2017 11:47 AM       Lab Results   Component Value Date/Time    Sodium 142 01/13/2017 11:47 AM    Potassium 4.3 01/13/2017 11:47 AM    Chloride 105 01/13/2017 11:47 AM    CO2 28 01/13/2017 11:47 AM    Anion gap 9 01/13/2017 11:47 AM    Glucose 88 01/13/2017 11:47 AM    BUN 12 01/13/2017 11:47 AM    Creatinine 0.76 01/13/2017 11:47 AM    BUN/Creatinine ratio 16 01/13/2017 11:47 AM    GFR est AA >60 01/13/2017 11:47 AM    GFR est non-AA >60 01/13/2017 11:47 AM    Calcium 9.3 01/13/2017 11:47 AM       Lab Results   Component Value Date/Time    Cholesterol, total 185 11/30/2016 08:30 AM    HDL Cholesterol 74 11/30/2016 08:30 AM    LDL, calculated 92.6 11/30/2016 08:30 AM    VLDL, calculated 18.4 11/30/2016 08:30 AM    Triglyceride 92 11/30/2016 08:30 AM    CHOL/HDL Ratio 2.5 11/30/2016 08:30 AM       Assessment/Plan:   1. Facial neuropathy: Etiology is unknown. - Discussed the pros/cons of getting a head CT and whether it would reveal new findings when compared to her last one 1 year ago. Pt reports no changes to her sx since her last CT scan. Sx are off/on. Pt declined getting the head CT at this time but agreed to getting this done if her sx worsen/reoccur/change. - Encouraged f/u with Neurology team    2. Stress: just lost her . PHQ-2 score is 2. Pt is grieving. I find her affect to be appropriate. She is tearful but doesn't cry when discussing the passing of her .   - Observation. Will do a f/u PHQ2 screening at her f/u apt. If still positive, will perform a PHQ-9 screening    3. Autoimmune Hepatitis: Latest LFTs are wnl   - Encouraged continued f/u with GI team    4. Hematuria Hx:   - Encouraged Urology f/u    5. Atrial Flutter: Rate controlled. - Encouraged pt to f/u with Cardiology and to c/w rx as prescribed. Health Maintenance Due   Topic Date Due    GLAUCOMA SCREENING Q2Y  01/05/2017       Lab review: labs are reviewed, up to date and normal    I have discussed the diagnosis with the patient and the intended plan as seen in the above orders. The patient has received an after-visit summary and questions were answered concerning future plans. I have discussed medication side effects and warnings with the patient as well. I have reviewed the plan of care with the patient, accepted their input and they are in agreement with the treatment goals. All questions were answered. The patient understands the plan of care. Handouts provided today with above information. Pt instructed if symptoms worsen to call the office or report to the ED for continued care. Greater than 50% of the visit time was spent in counseling and/or coordination of care. Follow-up Disposition:  Return in about 6 months (around 7/18/2017) for BP check.     Karen Barros MD

## 2017-03-15 ENCOUNTER — OFFICE VISIT (OUTPATIENT)
Dept: CARDIOLOGY CLINIC | Age: 72
End: 2017-03-15

## 2017-03-15 VITALS
BODY MASS INDEX: 20.73 KG/M2 | OXYGEN SATURATION: 98 % | DIASTOLIC BLOOD PRESSURE: 70 MMHG | SYSTOLIC BLOOD PRESSURE: 148 MMHG | WEIGHT: 117 LBS | HEIGHT: 63 IN | HEART RATE: 61 BPM

## 2017-03-15 DIAGNOSIS — K75.4 AUTOIMMUNE HEPATITIS (HCC): ICD-10-CM

## 2017-03-15 DIAGNOSIS — I48.3 TYPICAL ATRIAL FLUTTER (HCC): Primary | ICD-10-CM

## 2017-03-15 DIAGNOSIS — R07.9 CHEST PAIN, UNSPECIFIED TYPE: ICD-10-CM

## 2017-03-15 NOTE — PROGRESS NOTES
HISTORY OF PRESENT ILLNESS  Marveen Homans is a 70 y.o. female. Palpitations    Pertinent negatives include no fever, no chest pain, no claudication, no orthopnea, no PND, no abdominal pain, no nausea, no vomiting, no headaches, no dizziness, no cough and no shortness of breath. Patient presents for a follow-up office visit. The patient has a past medical history significant for supraventricular tachycardia. She was seen in the emergency room In November 2014 with acute onset palpitations and chest pain. Her initial EKG showed atrial flutter with a one-to-one AV conduction and heart rate of 240 beats per minute. Patient was treated with several rounds of adenosine and then diltiazem. The patient's heart rate improved to 130 beats were, however she remained in atrial flutter with borderline low pressure, so she underwent elective external cardioversion, which successfully converted her back to sinus rhythm with a single 50 J shock. The patient subsequently underwent an echocardiogram in November 2014 which showed a low-normal left ventricular ejection fraction 85-35%, grade 1 diastolic dysfunction, and no significant valvular heart disease. She was briefly on anticoagulation, but I was stopped because of her underlying liver disease. She remains on a low dose beta blocker. The patient for an event monitor for a week in February 2016 when she was complaining of dizziness. This demonstrated sinus rhythm with one 10 beat nonsustained run of SVT. Patient also had an episode of what sounds like atrial fibrillation in September 2016 which converted with both IV Lopressor and IV diltiazem. The patient's most recent episode occurred in December 2016, the symptoms awoke her from sleep and lasted for several hours before seeking medical attention in the emergency department. In the emergency room, she was discovered to be in atrial flutter with 2-1 conduction and a heart rate of 135.   She received a single dose of IV diltiazem 10 mg which converted her to sinus rhythm in less than an hour. Since hospital discharge, she denies any recurrent palpitations or tachycardia. She subsequently underwent a follow-up echocardiogram in December 2016 which was unchanged compared to her previous study. She then underwent a pharmacologic nuclear stress test which was a low risk study. No evidence of ischemia or prior infarct. She was last seen in the office 3-4 months ago. She denies any recurrent palpitations, no dizziness or syncope. No recurrent chest pain or shortness of breath. Her activity tolerance is stable. Past Medical History:   Diagnosis Date    Abdominal pain     [facility]    Dr Jakub Watson, s/p liver biopsy    Basal cell cancer     BPPV (benign paroxysmal positional vertigo)     Dr Drew English Cardiac echocardiogram 11/11/2014    EF 50-55%. No WMA. Gr 1 DDfx. RVSP 26 mmHg.       Chronic lung disease     Degenerative disc disease, lumbar     Enlarged lymph nodes     celiac artery-follow up CT showed improvement in MAY 2015    Fracture     GERD (gastroesophageal reflux disease)     HCV (hepatitis C virus)     Hematuria     gross with xarelto-Dr Kristie Posada, Dr Craig Boeck: Oncology-Hematology-[facility]    Hemorrhage 1962 and 1963    Hepatitis C     treated    Hepatitis C 2002    Liver disease     Lumbar spondylosis with myelopathy     Ocular migraine     Osteoarthritis     Osteoporosis     Otosclerosis     Bilateral    Pinched nerve [id number]    C5 and C6    Scoliosis     [facility]'s [facility]    SVT (supraventricular tachycardia) 10-28-14    Tachycardia     Transient global amnesia 9/4/07/, 4/12/12    x2    Wedging of vertebra (HCC)     Dry needling completed      Current Outpatient Prescriptions   Medication Sig Dispense Refill    metoprolol tartrate (LOPRESSOR) 25 mg tablet  1 tablet daily as needed 30 Tab 5    metoprolol succinate (TOPROL-XL) 25 mg XL tablet Take 1 Tab by mouth two (2) times a day. 60 Tab 6    cyclobenzaprine (FLEXERIL) 10 mg tablet Take 10 mg by mouth daily as needed. 1 tab at bedtime  Indications: FIBROMYALGIA      calcium 500 mg Tab Take 500 mg by mouth. BID      multivitamin (ONE A DAY) tablet Take 1 Tab by mouth daily.  cycloSPORINE (RESTASIS) 0.05 % ophthalmic emulsion Administer 1 drop to both eyes daily.  FLAXSEED OIL (OMEGA 3 PO) Take  by mouth daily as needed. Allergies   Allergen Reactions    Adhesive Hives    Mercurochrome [Merbromin] Rash    Merthiolate (Thimerosal) Rash    Nasacort [Triamcinolone Acetonide] Other (comments)     congestion    Pcn [Penicillins] Rash      Social History   Substance Use Topics    Smoking status: Former Smoker     Packs/day: 1.50     Years: 10.00     Types: Cigarettes     Quit date: 9/18/1973    Smokeless tobacco: Never Used    Alcohol use No                Review of Systems   Constitutional: Negative for chills, fever and weight loss. HENT: Negative for nosebleeds. Eyes: Negative for blurred vision and double vision. Respiratory: Negative for cough, shortness of breath and wheezing. Cardiovascular: Negative for chest pain, palpitations, orthopnea, claudication, leg swelling and PND. Gastrointestinal: Negative for abdominal pain, heartburn, nausea and vomiting. Genitourinary: Negative for dysuria and hematuria. Musculoskeletal: Negative for falls and myalgias. Skin: Negative for rash. Neurological: Negative for dizziness, focal weakness and headaches. Endo/Heme/Allergies: Does not bruise/bleed easily. Psychiatric/Behavioral: Negative for substance abuse. Visit Vitals    /70    Pulse 61    Ht 5' 3\" (1.6 m)    Wt 53.1 kg (117 lb)    SpO2 98%    BMI 20.73 kg/m2      Physical Exam   Constitutional: She is oriented to person, place, and time. She appears well-developed and well-nourished.    HENT:   Head: Normocephalic and atraumatic. Eyes: Conjunctivae are normal.   Neck: Neck supple. No JVD present. Carotid bruit is not present. Cardiovascular: Normal rate, regular rhythm, S1 normal, S2 normal and normal pulses. Exam reveals no gallop. No murmur heard. Pulmonary/Chest: Effort normal and breath sounds normal. She has no wheezes. She has no rales. Abdominal: Soft. Bowel sounds are normal. There is no tenderness. Musculoskeletal: She exhibits no edema. Neurological: She is alert and oriented to person, place, and time. Skin: Skin is warm and dry. EKG: Normal sinus rhythm, Borderline short AL interval, no delta waves, normal axis, Normal QTc interval, nonspecific T-wave abnormality. Compared to the previous EKG, no significant interval change. ASSESSMENT and PLAN    Paroxysmal atrial flutter. Initial episode in 2014 when the patient initially presented with a heart rate of 240 bpm, likely representing one-to-one conduction, so it is likely that the patient does have an accessory pathway, However, she does not have any evidence of ventricular preexcitation on EKG. She underwent an electrical cardioversion in 2014. Another episode of atrial flutter occurred in December 2016 prompting an emergency room visit. The tachycardia broke after receiving Cardizem 10 mg intravenously. No recurrent palpitations since that episode. She is now taking a higher dose of metoprolol XL at 25 mg twice daily. She is also immediate release metoprolol to help with breakthrough symptoms. She has not been interested in an atrial flutter ablation in the past.  She is not on any anticoagulants or antiplatelet agents because of easy bruising and bleeding from her chronic liver disease. Paroxysmal atrial fibrillation. This has been documented once in the past as well. Chest pain. No recurrence since last visit. The patient underwent a low risk pharmacologic nuclear stress test in December 2016.   No further workup needed. Autoimmune hepatitis. Patient finished her treatment for this. At this point I still feel the risk of starting an anticoagulant outweighs any benefit. Followup in 6 months, sooner if needed.

## 2017-03-15 NOTE — MR AVS SNAPSHOT
Visit Information Date & Time Provider Department Dept. Phone Encounter #  
 3/15/2017  9:40 AM Seth Partida MD [facility] [phone] Your Appointments 7/19/2017  8:00 AM  
Office Visit with Evie Juárez MD  
Internist of [address] [place] [address] Appt Note: 6 month f/u  
 [address] [facility], [address] [facility] [address] [place]  
  
   
 [address] [facility], [address] [place] [address]  
  
    
 11/8/2017  8:30 AM  
Office Visit with Kendrick Rosales NP Internist of [facility] ([facility]) Appt Note: ;   
 [phone] [facility], [address] [facility] [address] [place]  
  
   
 [address], [address] [facility] Upcoming Health Maintenance Date Due  
 GLAUCOMA SCREENING Q2Y 1/5/2017 Pneumococcal 65+ Low/Medium Risk (2 of 2 - PPSV23) 10/5/2017 MEDICARE YEARLY EXAM 11/8/2017 BREAST CANCER SCRN MAMMOGRAM 12/12/2017 COLONOSCOPY 9/24/2018 DTaP/Tdap/Td series (2 - Td) 10/5/2026 Allergies as of 3/15/2017  Review Complete On: 1/19/2017 By: Evie Juárez MD  
  
 Severity Noted Reaction Type Reactions Adhesive High 09/18/2013    Hives Mercurochrome [Merbromin] High 09/18/2013    Rash Merthiolate (Thimerosal) High 09/18/2013    Rash Nasacort [Triamcinolone Acetonide] High 09/18/2013    Other (comments)  
 congestion Pcn [Penicillins] Medium 09/18/2013    Rash Current Immunizations  Reviewed on 8/2/2016 No immunizations on file. Not reviewed this visit You Were Diagnosed With   
  
 Codes Comments Typical atrial flutter (HCC)    -  Primary ICD-10-CM: I48.3 ICD-9-CM: 427.32 Chest pain, unspecified type     ICD-10-CM: R07.9 ICD-9-CM: 786.50 Vitals BP Pulse Height(growth percentile) Weight(growth percentile) SpO2 BMI  
 148/70 61 5' 3\" (1.6 m) 117 lb (53.1 kg) 98% 20.73 kg/m2 OB Status Smoking Status Postmenopausal Former Smoker Vitals History BMI and BSA Data Body Mass Index Body Surface Area 20.73 kg/m 2 1.54 m 2 Preferred Pharmacy Pharmacy Name Phone [address], [address] [place] [address] [phone] [facility] [phone] Your Updated Medication List  
  
   
This list is accurate as of: 3/15/17 10:22 AM.  Always use your most recent med list.  
  
  
  
  
 calcium 500 mg Tab Take 500 mg by mouth. BID FLEXERIL 10 mg tablet Generic drug:  cyclobenzaprine Take 10 mg by mouth daily as needed. 1 tab at bedtime  Indications: FIBROMYALGIA  
  
 metoprolol succinate 25 mg XL tablet Commonly known as:  TOPROL-XL Take 1 Tab by mouth two (2) times a day. metoprolol tartrate 25 mg tablet Commonly known as:  LOPRESSOR  
1 tablet daily as needed  
  
 multivitamin tablet Commonly known as:  ONE A DAY Take 1 Tab by mouth daily. OMEGA 3 PO Take  by mouth daily as needed. RESTASIS 0.05 % ophthalmic emulsion Generic drug:  cycloSPORINE Administer 1 drop to both eyes daily. We Performed the Following AMB POC EKG ROUTINE W/ 12 LEADS, INTER & REP [72265 CPT(R)] Introducing [facility]! Lety Cesar introduces [employer] patient portal. Now you can access parts of your medical record, email your doctor's office, and request medication refills online. 1. In your internet browser, go to [email or web address] 2. Click on the First Time User? Click Here link in the Sign In box. You will see the New Member Sign Up page. 3. Enter your [employer] Access Code exactly as it appears below. You will not need to use this code after youve completed the sign-up process. If you do not sign up before the expiration date, you must request a new code. · [employer] Access Code: [email or web address] Expires: 4/10/2017 12:48 PM 
 
 4. Enter the last four digits of your Social Security Number (xxxx) and Date of Birth (mm/dd/yyyy) as indicated and click Submit. You will be taken to the next sign-up page. 5. Create a [employer] ID. This will be your [employer] login ID and cannot be changed, so think of one that is secure and easy to remember. 6. Create a [employer] password. You can change your password at any time. 7. Enter your Password Reset Question and Answer. This can be used at a later time if you forget your password. 8. Enter your e-mail address. You will receive e-mail notification when new information is available in [address] 9. Click Sign Up. You can now view and download portions of your medical record. 10. Click the Download Summary menu link to download a portable copy of your medical information. If you have questions, please visit the Frequently Asked Questions section of the [employer] website. Remember, [employer] is NOT to be used for urgent needs. For medical emergencies, dial 911. Now available from your iPhone and Android! Please provide this summary of care documentation to your next provider. Your primary care clinician is listed as Kyra Newton. If you have any questions after today's visit, please call [phone].

## 2017-03-15 NOTE — PROGRESS NOTES
1. Have you been to the ER, urgent care clinic since your last visit? Hospitalized since your last visit? No     2. Have you seen or consulted any other health care providers outside of the [address] [place] [address] since your last visit? Include any pap smears or colon screening.  No

## 2017-05-09 RX ORDER — METOPROLOL SUCCINATE 25 MG/1
TABLET, EXTENDED RELEASE ORAL
Qty: 60 TAB | Refills: 3 | Status: SHIPPED | OUTPATIENT
Start: 2017-05-09 | End: 2018-01-01 | Stop reason: SDUPTHER

## 2017-07-19 ENCOUNTER — OFFICE VISIT (OUTPATIENT)
Dept: INTERNAL MEDICINE CLINIC | Age: 72
End: 2017-07-19

## 2017-07-19 VITALS
RESPIRATION RATE: 18 BRPM | TEMPERATURE: 97.4 F | SYSTOLIC BLOOD PRESSURE: 122 MMHG | HEART RATE: 64 BPM | OXYGEN SATURATION: 100 % | DIASTOLIC BLOOD PRESSURE: 61 MMHG | BODY MASS INDEX: 21.44 KG/M2 | HEIGHT: 63 IN | WEIGHT: 121 LBS

## 2017-07-19 DIAGNOSIS — I48.3 TYPICAL ATRIAL FLUTTER (HCC): ICD-10-CM

## 2017-07-19 DIAGNOSIS — K75.4 AUTOIMMUNE HEPATITIS (HCC): ICD-10-CM

## 2017-07-19 DIAGNOSIS — M81.0 OSTEOPOROSIS, UNSPECIFIED OSTEOPOROSIS TYPE, UNSPECIFIED PATHOLOGICAL FRACTURE PRESENCE: Primary | ICD-10-CM

## 2017-07-19 DIAGNOSIS — F43.9 STRESS: ICD-10-CM

## 2017-07-19 NOTE — PROGRESS NOTES
INTERNISTS OF [facility]:  2017, MRN: [id number]      Walker Winters is a 70 y.o. female and presents to clinic for Depression (recently lost ) and Hypertension (follow up)    Subjective:   Pt is a 77yo female with h/o sjogren's syndrome per EHR, hydronephrosis, autoimmune hepatitis, osteoporosis, hematuria, and atrial flutter. 1. Stress: At her last apt, the pt was grieving the death of her . She had a PHQ-2 score of 2. He  just 1 month prior to that apt. the patient's PHQ 9 score today is 1.    2.  Atrial flutter: The patient has a long-standing history of atrial flutter, present over 6 months. The patient is followed by the cardiology team.  She does not need any refills on metoprolol. She is tolerating this medication well and without complications. 3.  Autoimmune hepatitis: The patient has a long-standing history of autoimmune hepatitis, present over 6 months. The patient is asymptomatic. She has a copy of her most recent LFTs. They are unremarkable. She does have a follow-up appointment with Dr. Vee Mckeon with gastroenterology. 4.  Health maintenance: The patient had a formal eye exam since her last appointment. The ophthalmology team is following her given concerns of a potentially developing retinal tear/detachment per her history. She has a follow-up appointment in 1-2 months. She has had no changes to her vision since her last ophthalmology appointment. The patient states that she does not want the flu shot later this year. 5.  Osteoporosis: Patient has a long-standing history of osteoporosis, present over one year. The patient rheumatologist has been following her for underlying Sjogren's disease. She diagnosed her with osteoporosis. The patient completed p.o. osteoporosis medications in the past.  Her rheumatologist nevertheless is recommending that she consider taking Forteo. The patient declines.         Patient Active Problem List    Diagnosis Date Noted  Osteoporosis 07/19/2017    Sjogren's syndrome ([facility] 10/05/2016    Autoimmune hepatitis ([facility] 07/13/2015    Hematuria 11/21/2014    Hydronephrosis 11/21/2014    Atrial flutter ([facility] 10/28/2014    Elevated liver function tests 10/28/2014       Current Outpatient Prescriptions   Medication Sig Dispense Refill    metoprolol succinate (TOPROL-XL) 25 mg XL tablet TAKE 1 TABLET BY MOUTH TWICE DAILY 60 Tab 3    metoprolol tartrate (LOPRESSOR) 25 mg tablet  1 tablet daily as needed 30 Tab 5    cyclobenzaprine (FLEXERIL) 10 mg tablet Take 10 mg by mouth daily as needed. 1 tab at bedtime  Indications: FIBROMYALGIA      calcium 500 mg Tab Take 500 mg by mouth. BID      multivitamin (ONE A DAY) tablet Take 1 Tab by mouth daily.  cycloSPORINE (RESTASIS) 0.05 % ophthalmic emulsion Administer 1 drop to both eyes daily.  FLAXSEED OIL (OMEGA 3 PO) Take  by mouth daily as needed. Allergies   Allergen Reactions    Adhesive Hives    Mercurochrome [Merbromin] Rash    Merthiolate (Thimerosal) Rash    Nasacort [Triamcinolone Acetonide] Other (comments)     congestion    Pcn [Penicillins] Rash       Past Medical History:   Diagnosis Date    Abdominal pain     Autoimmune hepatitis ([facility] 4-24-15    Dr David Butts, s/p liver biopsy    Basal cell cancer     BPPV (benign paroxysmal positional vertigo)     Dr Tha Mccurdy Cardiac echocardiogram 11/11/2014    EF 50-55%. No WMA. Gr 1 DDfx. RVSP 26 mmHg.       Chronic lung disease     Degenerative disc disease, lumbar     Enlarged lymph nodes     celiac artery-follow up CT showed improvement in MAY 2015    Fracture     GERD (gastroesophageal reflux disease)     HCV (hepatitis C virus)     Hematuria     gross with xarelto-Dr Casa Mancuso, Dr Paulson [facility]: Oncology-Hematology-VOA    Hemorrhage 1962 and 1963    Hepatitis C     treated    Hepatitis C 2002    Liver disease     Lumbar spondylosis with myelopathy     Ocular migraine     Osteoarthritis     Osteoporosis     Otosclerosis     Bilateral    Pinched nerve [id number]    C5 and C6    Scoliosis     Sjogren's syndrome ([facility] 1999    SVT (supraventricular tachycardia) ([facility] 10-28-14    Tachycardia     Transient global amnesia 9/4/07/, 4/12/12    x2    Wedging of vertebra ([facility]     Dry needling completed       Past Surgical History:   Procedure Laterality Date    HX ADENOIDECTOMY      HX APPENDECTOMY      HX COLONOSCOPY  9/2013    HX CYST INCISION AND DRAINAGE Bilateral 1973    HX TONSILLECTOMY         Family History   Problem Relation Age of Onset    Cancer Mother 76     stomach    Stroke Maternal Grandmother     Heart Disease Brother     Cancer Brother      Lyphoma    Stroke Father     Arthritis-osteo Sister     Osteoporosis Sister     No Known Problems Maternal Grandfather     Osteoporosis Paternal Grandmother     Osteoporosis Paternal Grandfather     Diabetes Brother        Social History   Substance Use Topics    Smoking status: Former Smoker     Packs/day: 1.50     Years: 10.00     Types: Cigarettes     Quit date: 9/18/1973    Smokeless tobacco: Never Used    Alcohol use No       ROS   Review of Systems   Constitutional: Negative for chills and fever. HENT: Negative for ear pain and sore throat. Eyes: Negative for blurred vision and pain. Respiratory: Negative for cough and shortness of breath. Cardiovascular: Negative for chest pain. Gastrointestinal: Negative for abdominal pain, blood in stool and melena. Genitourinary: Negative for dysuria. Musculoskeletal: Negative for joint pain and myalgias. Skin: Negative for rash. Neurological: Negative for tingling, focal weakness and headaches. Endo/Heme/Allergies: Does not bruise/bleed easily. Psychiatric/Behavioral: Negative for substance abuse.        Objective     Vitals:    07/19/17 0802   BP: 122/61   Pulse: 64   Resp: 18   Temp: 97.4 °F (36.3 °C)   SpO2: 100%   Weight: 121 lb (54.9 kg)   Height: 5' 3\" (1.6 m) PainSc:   0 - No pain       Physical Exam   Constitutional: She is oriented to person, place, and time and well-developed, well-nourished, and in no distress. HENT:   Head: Normocephalic and atraumatic. Right Ear: External ear normal.   Left Ear: External ear normal.   Nose: Nose normal.   Mouth/Throat: Oropharynx is clear and moist. No oropharyngeal exudate. Eyes: Conjunctivae and EOM are normal. Right eye exhibits no discharge. Left eye exhibits no discharge. No scleral icterus. Neck: Neck supple. Cardiovascular: Normal rate, regular rhythm, normal heart sounds and intact distal pulses. Exam reveals no gallop and no friction rub. No murmur heard. Pulmonary/Chest: Effort normal and breath sounds normal. No respiratory distress. She has no wheezes. She has no rales. Abdominal: Soft. Bowel sounds are normal. She exhibits no distension. There is no tenderness. There is no rebound and no guarding. Musculoskeletal: She exhibits no edema or tenderness (BUE are NTTP). Lymphadenopathy:     She has no cervical adenopathy. Neurological: She is alert and oriented to person, place, and time. She exhibits normal muscle tone. Gait normal.   Skin: Skin is warm and dry. No erythema. Psychiatric: Affect normal.   Nursing note and vitals reviewed.       LABS   Data Review:   Lab Results   Component Value Date/Time    WBC 6.1 01/13/2017 11:47 AM    HGB 13.0 01/13/2017 11:47 AM    HCT 39.3 01/13/2017 11:47 AM    PLATELET 387 59/36/0596 11:47 AM    MCV 91.4 01/13/2017 11:47 AM       Lab Results   Component Value Date/Time    Sodium 142 01/13/2017 11:47 AM    Potassium 4.3 01/13/2017 11:47 AM    Chloride 105 01/13/2017 11:47 AM    CO2 28 01/13/2017 11:47 AM    Anion gap 9 01/13/2017 11:47 AM    Glucose 88 01/13/2017 11:47 AM    BUN 12 01/13/2017 11:47 AM    Creatinine 0.76 01/13/2017 11:47 AM    BUN/Creatinine ratio 16 01/13/2017 11:47 AM    GFR est AA >60 01/13/2017 11:47 AM    GFR est non-AA >60 01/13/2017 11:47 AM    Calcium 9.3 01/13/2017 11:47 AM       Lab Results   Component Value Date/Time    Cholesterol, total 185 11/30/2016 08:30 AM    HDL Cholesterol 74 11/30/2016 08:30 AM    LDL, calculated 92.6 11/30/2016 08:30 AM    VLDL, calculated 18.4 11/30/2016 08:30 AM    Triglyceride 92 11/30/2016 08:30 AM    CHOL/HDL Ratio 2.5 11/30/2016 08:30 AM       Assessment/Plan:   1. Atrial flutter: Stable. I encouraged patient to continue taking her metoprolol per cardiology recommendations. 2.  Autoimmune hepatitis: Stable. I encouraged patient to continue following up with the gastroenterology team.  I reviewed her most recent LFTs. I am having and scanned into her EHR    3. Grieving: Her PHQ 9 score is 1.  Observation. 4. Health Maintenance:    - Requesting a copy of her last formal eye exam   - The pt is declining the flu shot later this year    5. Osteoporosis:  I counseled the patient on the various treatment options her osteoporosis. All questions were answered. Health Maintenance Due   Topic Date Due    GLAUCOMA SCREENING Q2Y  01/05/2017     Lab review: labs are reviewed in the EHR    I have discussed the diagnosis with the patient and the intended plan as seen in the above orders. The patient has received an after-visit summary and questions were answered concerning future plans. I have discussed medication side effects and warnings with the patient as well. I have reviewed the plan of care with the patient, accepted their input and they are in agreement with the treatment goals. All questions were answered. The patient understands the plan of care. Handouts provided today with above information. Pt instructed if symptoms worsen to call the office or report to the ED for continued care. Greater than 50% of the visit time was spent in counseling and/or coordination of care. Follow-up Disposition:  Return in about 6 months (around 1/19/2018) for BP check.     Veda Mattson MD

## 2017-07-19 NOTE — PROGRESS NOTES
1. Have you been to the ER, urgent care clinic since your last visit? Hospitalized since your last visit? No    2. Have you seen or consulted any other health care providers outside of the [address] [place] [address] since your last visit? Include any pap smears or colon screening.  No

## 2017-07-19 NOTE — MR AVS SNAPSHOT
Visit Information Date & Time Provider Department Dept. Phone Encounter #  
 7/19/2017  8:00 AM Bruce Eagle MD Internist of [facility] [id number] Follow-up Instructions Return in about 6 months (around 1/19/2018) for BP check. Your Appointments 8/24/2017  9:30 AM  
Follow Up with Ann Melendez MD  
[address] ([address]) Appt Note: FROM 8/2/16  
 [address] [place] [address], [address] [address]  
[phone]  
  
   
 [address] [place] [address], [address] [place] [address]  
  
    
 9/29/2017 10:20 AM  
Follow Up with Staci Smith MD  
[facility] ([address]) Appt Note: 6 month follow up [place] [place] [id number]  
[phone] [place]  
  
    
 1/19/2018  8:00 AM  
Office Visit with Bruce Eagle MD  
Internist of [facility] [address]) Appt Note: 6 month f/u  
 [address] [facility], [address] [place] [address] [place]  
  
   
 [address], [address] Upcoming Health Maintenance Date Due  
 GLAUCOMA SCREENING Q2Y 1/5/2017 INFLUENZA AGE 9 TO ADULT 8/1/2017 Pneumococcal 65+ Low/Medium Risk (2 of 2 - PPSV23) 10/5/2017 MEDICARE YEARLY EXAM 11/8/2017 BREAST CANCER SCRN MAMMOGRAM 12/12/2017 COLONOSCOPY 9/24/2018 DTaP/Tdap/Td series (2 - Td) 10/5/2026 Allergies as of 7/19/2017  Review Complete On: 7/19/2017 By: Bruce Eagle MD  
  
 Severity Noted Reaction Type Reactions Adhesive High 09/18/2013    Hives Mercurochrome [Merbromin] High 09/18/2013    Rash Merthiolate (Thimerosal) High 09/18/2013    Rash Nasacort [Triamcinolone Acetonide] High 09/18/2013    Other (comments)  
 congestion Pcn [Penicillins] Medium 09/18/2013    Rash Current Immunizations  Reviewed on 8/2/2016 No immunizations on file. Not reviewed this visit Vitals BP Pulse Temp Resp Height(growth percentile) Weight(growth percentile) 122/61 64 97.4 °F (36.3 °C) 18 5' 3\" (1.6 m) 121 lb (54.9 kg) SpO2 BMI OB Status Smoking Status 100% 21.43 kg/m2 Postmenopausal Former Smoker Vitals History BMI and BSA Data Body Mass Index Body Surface Area  
 21.43 kg/m 2 1.56 m 2 Preferred Pharmacy Pharmacy Name Phone [address], [address] [place] [phone] [facility] [phone] Your Updated Medication List  
  
   
This list is accurate as of: 7/19/17  8:28 AM.  Always use your most recent med list.  
  
  
  
  
 calcium 500 mg Tab Take 500 mg by mouth. BID FLEXERIL 10 mg tablet Generic drug:  cyclobenzaprine Take 10 mg by mouth daily as needed. 1 tab at bedtime  Indications: FIBROMYALGIA  
  
 metoprolol succinate 25 mg XL tablet Commonly known as:  TOPROL-XL  
TAKE 1 TABLET BY MOUTH TWICE DAILY  
  
 metoprolol tartrate 25 mg tablet Commonly known as:  LOPRESSOR  
1 tablet daily as needed  
  
 multivitamin tablet Commonly known as:  ONE A DAY Take 1 Tab by mouth daily. OMEGA 3 PO Take  by mouth daily as needed. RESTASIS 0.05 % ophthalmic emulsion Generic drug:  cycloSPORINE Administer 1 drop to both eyes daily. Follow-up Instructions Return in about 6 months (around 1/19/2018) for BP check. Patient Instructions Body Mass Index: Care Instructions Your Care Instructions Body mass index (BMI) can help you see if your weight is raising your risk for health problems. It uses a formula to compare how much you weigh with how tall you are. · A BMI lower than 18.5 is considered underweight. · A BMI between 18.5 and 24.9 is considered healthy. · A BMI between 25 and 29.9 is considered overweight. A BMI of 30 or higher is considered obese. If your BMI is in the normal range, it means that you have a lower risk for weight-related health problems. If your BMI is in the overweight or obese range, you may be at increased risk for weight-related health problems, such as high blood pressure, heart disease, stroke, arthritis or joint pain, and diabetes. If your BMI is in the underweight range, you may be at increased risk for health problems such as fatigue, lower protection (immunity) against illness, muscle loss, bone loss, hair loss, and hormone problems. BMI is just one measure of your risk for weight-related health problems. You may be at higher risk for health problems if you are not active, you eat an unhealthy diet, or you drink too much alcohol or use tobacco products. Follow-up care is a key part of your treatment and safety. Be sure to make and go to all appointments, and call your doctor if you are having problems. It's also a good idea to know your test results and keep a list of the medicines you take. How can you care for yourself at home? · Practice healthy eating habits. This includes eating plenty of fruits, vegetables, whole grains, lean protein, and low-fat dairy. · If your doctor recommends it, get more exercise. Walking is a good choice. Bit by bit, increase the amount you walk every day. Try for at least 30 minutes on most days of the week. · Do not smoke. Smoking can increase your risk for health problems. If you need help quitting, talk to your doctor about stop-smoking programs and medicines. These can increase your chances of quitting for good. · Limit alcohol to 2 drinks a day for men and 1 drink a day for women. Too much alcohol can cause health problems. If you have a BMI higher than 25 · Your doctor may do other tests to check your risk for weight-related health problems.  This may include measuring the distance around your waist. A waist measurement of more than 40 inches in men or 35 inches in women can increase the risk of weight-related health problems. · Talk with your doctor about steps you can take to stay healthy or improve your health. You may need to make lifestyle changes to lose weight and stay healthy, such as changing your diet and getting regular exercise. If you have a BMI lower than 18.5 · Your doctor may do other tests to check your risk for health problems. · Talk with your doctor about steps you can take to stay healthy or improve your health. You may need to make lifestyle changes to gain or maintain weight and stay healthy, such as getting more healthy foods in your diet and doing exercises to build muscle. Where can you learn more? Go to http://pratik-niki.info/. Enter S176 in the search box to learn more about \"Body Mass Index: Care Instructions. \" Current as of: January 23, 2017 Content Version: 11.3 © 0246-1463 "[employer]. Care instructions adapted under license by [employer] (which disclaims liability or warranty for this information). If you have questions about a medical condition or this instruction, always ask your healthcare professional. [address] any warranty or liability for your use of this information. Introducing [facility]! Angelina Burgess introduces Pogoseat patient portal. Now you can access parts of your medical record, email your doctor's office, and request medication refills online. 1. In your internet browser, go to [email or web address] 2. Click on the First Time User? Click Here link in the Sign In box. You will see the New Member Sign Up page. 3. Enter your Pogoseat Access Code exactly as it appears below. You will not need to use this code after youve completed the sign-up process. If you do not sign up before the expiration date, you must request a new code. · Pogoseat Access Code: [email or web address] Expires: 10/17/2017  8:27 AM 
 
 4. Enter the last four digits of your Social Security Number (xxxx) and Date of Birth (mm/dd/yyyy) as indicated and click Submit. You will be taken to the next sign-up page. 5. Create a 'Rock' Your Paper ID. This will be your 'Rock[employer] Your [employer] login ID and cannot be changed, so think of one that is secure and easy to remember. 6. Create a 'Rock[employer] Your Paper password. You can change your password at any time. 7. Enter your Password Reset Question and Answer. This can be used at a later time if you forget your password. 8. Enter your e-mail address. You will receive e-mail notification when new information is available in [address] 9. Click Sign Up. You can now view and download portions of your medical record. 10. Click the Download Summary menu link to download a portable copy of your medical information. If you have questions, please visit the Frequently Asked Questions section of the 'Rock[employer] Your [employer] website. Remember, '[employer] is NOT to be used for urgent needs. For medical emergencies, dial 911. Now available from your iPhone and Android! Please provide this summary of care documentation to your next provider. Your primary care clinician is listed as Bill Oglesby. If you have any questions after today's visit, please call [phone].

## 2017-07-19 NOTE — PATIENT INSTRUCTIONS
Body Mass Index: Care Instructions  Your Care Instructions    Body mass index (BMI) can help you see if your weight is raising your risk for health problems. It uses a formula to compare how much you weigh with how tall you are. · A BMI lower than 18.5 is considered underweight. · A BMI between 18.5 and 24.9 is considered healthy. · A BMI between 25 and 29.9 is considered overweight. A BMI of 30 or higher is considered obese. If your BMI is in the normal range, it means that you have a lower risk for weight-related health problems. If your BMI is in the overweight or obese range, you may be at increased risk for weight-related health problems, such as high blood pressure, heart disease, stroke, arthritis or joint pain, and diabetes. If your BMI is in the underweight range, you may be at increased risk for health problems such as fatigue, lower protection (immunity) against illness, muscle loss, bone loss, hair loss, and hormone problems. BMI is just one measure of your risk for weight-related health problems. You may be at higher risk for health problems if you are not active, you eat an unhealthy diet, or you drink too much alcohol or use tobacco products. Follow-up care is a key part of your treatment and safety. Be sure to make and go to all appointments, and call your doctor if you are having problems. It's also a good idea to know your test results and keep a list of the medicines you take. How can you care for yourself at home? · Practice healthy eating habits. This includes eating plenty of fruits, vegetables, whole grains, lean protein, and low-fat dairy. · If your doctor recommends it, get more exercise. Walking is a good choice. Bit by bit, increase the amount you walk every day. Try for at least 30 minutes on most days of the week. · Do not smoke. Smoking can increase your risk for health problems. If you need help quitting, talk to your doctor about stop-smoking programs and medicines. These can increase your chances of quitting for good. · Limit alcohol to 2 drinks a day for men and 1 drink a day for women. Too much alcohol can cause health problems. If you have a BMI higher than 25  · Your doctor may do other tests to check your risk for weight-related health problems. This may include measuring the distance around your waist. A waist measurement of more than 40 inches in men or 35 inches in women can increase the risk of weight-related health problems. · Talk with your doctor about steps you can take to stay healthy or improve your health. You may need to make lifestyle changes to lose weight and stay healthy, such as changing your diet and getting regular exercise. If you have a BMI lower than 18.5  · Your doctor may do other tests to check your risk for health problems. · Talk with your doctor about steps you can take to stay healthy or improve your health. You may need to make lifestyle changes to gain or maintain weight and stay healthy, such as getting more healthy foods in your diet and doing exercises to build muscle. Where can you learn more? Go to http://pratik-niki.info/. Enter S176 in the search box to learn more about \"Body Mass Index: Care Instructions. \"  Current as of: January 23, 2017  Content Version: 11.3  © 2307-5133 [employer]. Care instructions adapted under license by [employer] (which disclaims liability or warranty for this information). If you have questions about a medical condition or this instruction, always ask your healthcare professional. [place] [address] any warranty or liability for your use of this information.

## 2017-08-24 ENCOUNTER — OFFICE VISIT (OUTPATIENT)
Dept: PULMONOLOGY | Age: 72
End: 2017-08-24

## 2017-08-24 VITALS
DIASTOLIC BLOOD PRESSURE: 60 MMHG | RESPIRATION RATE: 16 BRPM | SYSTOLIC BLOOD PRESSURE: 108 MMHG | OXYGEN SATURATION: 99 % | BODY MASS INDEX: 21.44 KG/M2 | HEART RATE: 66 BPM | WEIGHT: 121 LBS | TEMPERATURE: 97 F | HEIGHT: 63 IN

## 2017-08-24 DIAGNOSIS — R05.8 COUGH PRESENT FOR GREATER THAN 3 WEEKS: ICD-10-CM

## 2017-08-24 DIAGNOSIS — J47.9 BRONCHIECTASIS WITHOUT COMPLICATION (HCC): Primary | ICD-10-CM

## 2017-08-24 DIAGNOSIS — M35.00 SJOGREN'S SYNDROME, WITH UNSPECIFIED ORGAN INVOLVEMENT (HCC): ICD-10-CM

## 2017-08-24 NOTE — PROGRESS NOTES
[facility]  Pulmonary, Critical Care, and Sleep Medicine      Pulmonary Office Visit    Name: Ren Daly     : 1945     Date: 2017        Subjective:     Patient is a 70 y.o. female referred by Dr. Gloria Juarez ( now Dr. Garcia Mew- PCP) for evaluation of abnormal CXR report. 17  Patient states she is feeling well now. Occasionally she has some cough - non productive. Denies any current cough, SOB. Denies fever chills. Denies any chest pain. No interval change in medical condition. 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 HPI:  The patient has a past medical history significant for remote supraventricular tachycardia. She was seen in the emergency room In 2014 with acute onset palpitations and chest pain. Her initial EKG showed atrial flutter with a one-to-one AV conduction and heart rate of 240 beats per minute. Patient was treated with several rounds of adenosine and then diltiazem. The patient's heart rate improved to 130 beats were, however she remained in atrial flutter with borderline low pressure, so she underwent elective external cardioversion, which successfully converted her back to sinus rhythm with a single 50 J shock. She was discharged home with a low dose of metoprolol XL and Xarelto for anti-coagulation. The patient subsequently underwent an echocardiogram in 2014 which showed a low-normal left ventricular ejection fraction 03-64%, grade 1 diastolic dysfunction, and no significant valvular heart disease. She does have a history of hepatitis C treated successfully 8-9 years ago, With no residual viral load. She does have a history of Sjogren's syndrome, and is felt to likely have autoimmune hepatitis. Because of abnormal LFTs, her anticoagulation was stopped last year.  She ultimately underwent a liver biopsy which confirmed on autoimunehepatitis, which is now being treated. With mercaptopurine. No recurrent palpitations. No dizziness, syncope or near syncope. No new chest pain or pressure. No shortness of breath or leg swelling. Social History Main Topics    Smoking status: Former Smoker -- 1.50 packs/day for 10 years     Types: Cigarettes     Quit date: 09/18/1973    Smokeless tobacco: Never Used    Alcohol Use: No    Drug Use: Yes     Special: Prescription, OTC    Sexual Activity: Not on file     Allergies   Allergen Reactions    Adhesive Hives    Mercurochrome [Merbromin] Rash    Merthiolate (Thimerosal) Rash    Nasacort [Triamcinolone Acetonide] Other (comments)     congestion    Pcn [Penicillins] Rash     Current Outpatient Prescriptions   Medication Sig Dispense Refill    metoprolol succinate (TOPROL-XL) 25 mg XL tablet TAKE 1 TABLET BY MOUTH TWICE DAILY 60 Tab 3    metoprolol tartrate (LOPRESSOR) 25 mg tablet  1 tablet daily as needed 30 Tab 5    cyclobenzaprine (FLEXERIL) 10 mg tablet Take 10 mg by mouth daily as needed. 1 tab at bedtime  Indications: FIBROMYALGIA      calcium 500 mg Tab Take 500 mg by mouth. BID      multivitamin (ONE A DAY) tablet Take 1 Tab by mouth daily.  cycloSPORINE (RESTASIS) 0.05 % ophthalmic emulsion Administer 1 drop to both eyes daily.  FLAXSEED OIL (OMEGA 3 PO) Take  by mouth daily as needed.        Review of Systems:  HEENT: No epistaxis, no nasal drainage, no difficulty in swallowing, no redness in eyes  Respiratory: as above  Cardiovascular: no chest pain, no palpitations, no chronic leg edema, no syncope  Gastrointestinal: no abd pain, no vomiting, no diarrhea, no bleeding symptoms  Genitourinary: No urinary symptoms or hematuria  Integument/breast: No ulcers or rashes  Musculoskeletal:Neg  Neurological: No focal weakness, no seizures, no headaches  Behvioral/Psych: No anxiety, no depression  Constitutional: No fever, no chills, no weight loss, no night sweats Objective:     Visit Vitals    /60 (BP 1 Location: Left arm, BP Patient Position: Sitting)    Pulse 66    Temp 97 °F (36.1 °C) (Oral)    Resp 16    Ht 5' 3\" (1.6 m)    Wt 54.9 kg (121 lb)    SpO2 99%  Comment: RA Rest    BMI 21.43 kg/m2        Physical Exam:   General: comfortable, no acute distress  HEENT: pupils reactive, sclera anicteric, EOM intact  Neck: No adenopathy or thyroid swelling, no lymphadenopathy or JVD, supple  CVS: S1S2 no murmurs  RS: Mod AE bilaterally, no tactile fremitus or egophony, no accessory muscle use  Abd: soft, non tender, no hepatosplenomegaly  Neuro: non focal, awake, alert  Extrm: no leg edema, clubbing or cyanosis  Skin: no rash    Data review:     PFT's:10/22/15:  Pulmonary Function Test    Flows:  Normal flows    Volumes:  Normal Volumes    Flow Volume Loop:  Normal Flow Volume Loop    Diffusion:  Normal Diffusion Capacity    Impression:  Normal study    Imaging:  I have personally reviewed the patients radiographs and have reviewed the reports:   CT scan of chest-7/5/2016  Right lung:  Diffusely hyperinflated. Bandlike apical pleural-parenchymal  thickening is stable in morphology compared to previous CT. A punctate calcified  granuloma in the lateral upper lobe (image 20) is stable. There is no evidence  of soft tissue mass. Mild diffuse cylindrical bronchiectasis is redemonstrated.     Left lung:  Diffusely hyperinflated with mild burden of apical  pleural-parenchymal thickening, similar in morphology due to previous CT. There  is no evidence of mass.  Diffuse cylindrical bronchiectasis is stable. 4/2015:  CXR-COPD. Peribronchial cuffing unchanged which may be related to reactive airways  disease/chronic bronchitis. An area of opacity again suspected in the left infrahilar region not improved  from prior chest radiograph    CT scan of chest:4/2015:  Lymph nodes: No lymphadenopathy.   Thyroid: Normal in size without mass in the visualized parenchyma. .  Mediastinum: There is a small amount of fluid in the AP window. The heart is  normal in size. The esophagus is normal.  Lungs:   Bilateral apical pleural-parenchymal thickening is present. The lungs are  hyperinflated. There is no evidence of mass. No pleural effusion. ABDOMEN:   The visualized portions of the liver, spleen, pancreas, adrenal glands, and  kidneys are normal. Bilateral extrarenal pelves are suspected but incompletely  imaged on this exam.. Bones: There is mild superior plate compression of T5. There is no underlying  osseous lesion. Remainder of the skeletal structures are unremarkable for age. IMPRESSION:  1. COPD. Bilateral apical pleural-parenchymal thickening. No discrete mass on  this exam. If outside imaging becomes available for comparison: Addendum can be  made to this report. 2. Mild superior endplate compression of T5. IMPRESSION:   Abnormal CXR and Ct scan of chest- chronic abnormalities. Diffusely hyperinflated with mild burden of apical  pleural-parenchymal thickening, similar in morphology due to previous CT. There is no evidence of mass.  Diffuse cylindrical bronchiectasis is stable. · Sjogrens syndrome with predominant eye sicca symptoms on restasis  · Autoimmune Hepatitis  · H/o atrial flutter  · Ex- smoker      RECOMMENDATIONS:   · No further testing or intervention for CT scan abnormalities  · Continue to Optimize GERD treatment for cough control optimization  · GERD prevention instructions provided  · Preventive vaccinations  · Monitor periodically -yearly   · Discussed with patient and answered questions     Health maintenance screens deferred to Primary care provider.      Cris Cotto MD

## 2017-08-24 NOTE — MR AVS SNAPSHOT
Visit Information Date & Time Provider Department Dept. Phone Encounter #  
 8/24/2017  9:30 AM MD Feng Lopes [facility] Ran Kinney [id number] Follow-up Instructions Return in about 1 year (around 8/24/2018). Your Appointments 9/29/2017 10:20 AM  
Follow Up with Hair Mix MD  
[facility] ([address]) Appt Note: 6 month follow up [place] [address] [address] [phone] Adan Cooley  
  
    
 1/19/2018  8:00 AM  
Office Visit with Veda Mattson MD  
Internist of [address] [place] [address] Road [address] [facility]) Appt Note: 6 month f/u  
 [address] [facility], [address] [facility]  
  
   
 [address] Upcoming Health Maintenance Date Due INFLUENZA AGE 9 TO ADULT 8/1/2017 BREAST CANCER SCRN MAMMOGRAM 12/12/2017 Pneumococcal 65+ Low/Medium Risk (2 of 2 - PPSV23) 10/5/2017 MEDICARE YEARLY EXAM 11/8/2017 COLONOSCOPY 9/24/2018 GLAUCOMA SCREENING Q2Y 2/23/2019 DTaP/Tdap/Td series (2 - Td) 10/5/2026 Allergies as of 8/24/2017  Review Complete On: 8/24/2017 By: Remy Cuevas MD  
  
 Severity Noted Reaction Type Reactions Adhesive High 09/18/2013    Hives Mercurochrome [Merbromin] High 09/18/2013    Rash Merthiolate (Thimerosal) High 09/18/2013    Rash Nasacort [Triamcinolone Acetonide] High 09/18/2013    Other (comments)  
 congestion Pcn [Penicillins] Medium 09/18/2013    Rash Current Immunizations  Reviewed on 8/24/2017 No immunizations on file. Reviewed by Dmitriy Delgado LPN on 0/83/1402 at  9:29 AM  
Vitals BP Pulse Temp Resp Height(growth percentile) Weight(growth percentile)  108/60 (BP 1 Location: Left arm, BP Patient Position: Sitting) 66 97 °F (36.1 °C) (Oral) 16 5' 3\" (1.6 m) 121 lb (54.9 kg) SpO2 BMI OB Status Smoking Status 99% 21.43 kg/m2 Postmenopausal Former Smoker BMI and BSA Data Body Mass Index Body Surface Area  
 21.43 kg/m 2 1.56 m 2 Preferred Pharmacy Pharmacy Name Phone [address], [address] [facility] 22 [address] [facility] [phone] Your Updated Medication List  
  
   
This list is accurate as of: 8/24/17  9:50 AM.  Always use your most recent med list.  
  
  
  
  
 calcium 500 mg Tab Take 500 mg by mouth. BID FLEXERIL 10 mg tablet Generic drug:  cyclobenzaprine Take 10 mg by mouth daily as needed. 1 tab at bedtime  Indications: FIBROMYALGIA  
  
 metoprolol succinate 25 mg XL tablet Commonly known as:  TOPROL-XL  
TAKE 1 TABLET BY MOUTH TWICE DAILY  
  
 metoprolol tartrate 25 mg tablet Commonly known as:  LOPRESSOR  
1 tablet daily as needed  
  
 multivitamin tablet Commonly known as:  ONE A DAY Take 1 Tab by mouth daily. OMEGA 3 PO Take  by mouth daily as needed. RESTASIS 0.05 % ophthalmic emulsion Generic drug:  cycloSPORINE Administer 1 drop to both eyes daily. Follow-up Instructions Return in about 1 year (around 8/24/2018). Introducing [facility]! Maninder Hart introduces Shoop patient portal. Now you can access parts of your medical record, email your doctor's office, and request medication refills online. 1. In your internet browser, go to [email or web address] 2. Click on the First Time User? Click Here link in the Sign In box. You will see the New Member Sign Up page. 3. Enter your Shoop Access Code exactly as it appears below. You will not need to use this code after youve completed the sign-up process. If you do not sign up before the expiration date, you must request a new code. · Shoop Access Code: [email or web address] Expires: 10/17/2017  8:27 AM 
 
4. Enter the last four digits of your Social Security Number (xxxx) and Date of Birth (mm/dd/yyyy) as indicated and click Submit. You will be taken to the next sign-up page. 5. Create a Biotectix ID. This will be your Biotectix login ID and cannot be changed, so think of one that is secure and easy to remember. 6. Create a Biotectix password. You can change your password at any time. 7. Enter your Password Reset Question and Answer. This can be used at a later time if you forget your password. 8. Enter your e-mail address. You will receive e-mail notification when new information is available in [address] 9. Click Sign Up. You can now view and download portions of your medical record. 10. Click the Download Summary menu link to download a portable copy of your medical information. If you have questions, please visit the Frequently Asked Questions section of the Biotectix website. Remember, Biotectix is NOT to be used for urgent needs. For medical emergencies, dial 911. Now available from your iPhone and Android! Please provide this summary of care documentation to your next provider. Your primary care clinician is listed as [place] Car[place]. If you have any questions after today's visit, please call [phone].

## 2017-08-24 NOTE — PROGRESS NOTES
Ms. Rosi Sharma has a reminder for a \"due or due soon\" health maintenance. I have asked that she contact her primary care provider for follow-up on this health maintenance.

## 2017-09-21 ENCOUNTER — HOSPITAL ENCOUNTER (OUTPATIENT)
Dept: GENERAL RADIOLOGY | Age: 72
Discharge: HOME | End: 2017-09-21
Payer: MEDICARE

## 2017-09-21 ENCOUNTER — OFFICE VISIT (OUTPATIENT)
Dept: INTERNAL MEDICINE CLINIC | Age: 72
End: 2017-09-21

## 2017-09-21 VITALS
WEIGHT: 122 LBS | RESPIRATION RATE: 18 BRPM | TEMPERATURE: 97.8 F | DIASTOLIC BLOOD PRESSURE: 70 MMHG | SYSTOLIC BLOOD PRESSURE: 142 MMHG | BODY MASS INDEX: 21.62 KG/M2 | HEART RATE: 70 BPM | OXYGEN SATURATION: 98 % | HEIGHT: 63 IN

## 2017-09-21 DIAGNOSIS — M54.32 LEFT SIDED SCIATICA: ICD-10-CM

## 2017-09-21 DIAGNOSIS — M79.605 LEFT LEG PAIN: Primary | ICD-10-CM

## 2017-09-21 DIAGNOSIS — M25.552 LEFT HIP PAIN: ICD-10-CM

## 2017-09-21 PROCEDURE — 72114 X-RAY EXAM L-S SPINE BENDING: CPT

## 2017-09-21 PROCEDURE — 73502 X-RAY EXAM HIP UNI 2-3 VIEWS: CPT

## 2017-09-21 NOTE — MR AVS SNAPSHOT
Visit Information Date & Time Provider Department Dept. Phone Encounter #  
 9/21/2017 12:30 PM Lexie Stubbs MD Internist of [address] [id number] Your Appointments 11/1/2017  8:00 AM  
Follow Up with Velma Calero MD  
[facility] ([address]) Appt Note: 6 month follow up; 9/13/17 - lmom to r/s appt. provider out of office plk; Rescheduling Appointment From 09/29/2017 [place] [address]  
[phone] [place]  
  
    
 1/19/2018  8:00 AM  
Office Visit with Lexie Stubbs MD  
Internist of [address] [facility] [address] [facility]) Appt Note: 6 month f/u  
 [address] [facility], [address] [place] [address] [place]  
  
   
 [address], [address] Upcoming Health Maintenance Date Due INFLUENZA AGE 9 TO ADULT 8/1/2017 BREAST CANCER SCRN MAMMOGRAM 12/12/2017 Pneumococcal 65+ Low/Medium Risk (2 of 2 - PPSV23) 10/5/2017 MEDICARE YEARLY EXAM 11/8/2017 COLONOSCOPY 9/24/2018 GLAUCOMA SCREENING Q2Y 2/23/2019 DTaP/Tdap/Td series (2 - Td) 10/5/2026 Allergies as of 9/21/2017  Review Complete On: 9/21/2017 By: Lexie Stubbs MD  
  
 Severity Noted Reaction Type Reactions Adhesive High 09/18/2013    Hives Mercurochrome [Merbromin] High 09/18/2013    Rash Merthiolate (Thimerosal) High 09/18/2013    Rash Nasacort [Triamcinolone Acetonide] High 09/18/2013    Other (comments)  
 congestion Pcn [Penicillins] Medium 09/18/2013    Rash Current Immunizations  Reviewed on 8/24/2017 No immunizations on file. Not reviewed this visit You Were Diagnosed With   
  
 Codes Comments Left leg pain    -  Primary ICD-10-CM: M79.605 ICD-9-CM: 729.5 Left sided sciatica     ICD-10-CM: M54.32 
ICD-9-CM: 724.3 Left hip pain     ICD-10-CM: M25.552 ICD-9-CM: 719.45 Vitals BP Pulse Temp Resp Height(growth percentile) Weight(growth percentile) 142/70 70 97.8 °F (36.6 °C) 18 5' 3\" (1.6 m) 122 lb (55.3 kg) SpO2 BMI OB Status Smoking Status 98% 21.61 kg/m2 Postmenopausal Former Smoker BMI and BSA Data Body Mass Index Body Surface Area  
 21.61 kg/m 2 1.57 m 2 Preferred Pharmacy Pharmacy Name Phone [address], Trey Kwon [address] [place] [address] [phone]  [place] [facility] [phone] Your Updated Medication List  
  
   
This list is accurate as of: 9/21/17 12:55 PM.  Always use your most recent med list.  
  
  
  
  
 calcium 500 mg Tab Take 500 mg by mouth. BID FLEXERIL 10 mg tablet Generic drug:  cyclobenzaprine Take 10 mg by mouth daily as needed. 1 tab at bedtime  Indications: FIBROMYALGIA  
  
 metoprolol succinate 25 mg XL tablet Commonly known as:  TOPROL-XL  
TAKE 1 TABLET BY MOUTH TWICE DAILY  
  
 metoprolol tartrate 25 mg tablet Commonly known as:  LOPRESSOR  
1 tablet daily as needed  
  
 multivitamin tablet Commonly known as:  ONE A DAY Take 1 Tab by mouth daily. OMEGA 3 PO Take  by mouth daily as needed. RESTASIS 0.05 % ophthalmic emulsion Generic drug:  cycloSPORINE Administer 1 drop to both eyes daily. To-Do List   
 09/21/2017 Imaging:  DUPLEX LOWER EXT VENOUS LEFT   
  
 09/21/2017 Imaging:  US EXT NONVAS LT LTD   
  
 09/21/2017 Imaging:  XR HIP LT W OR WO PELV 2-3 VWS Around 09/21/2017 Imaging:  XR SPINE LUMB COMP W BEND Introducing [facility]! [facility] introduces Guvera patient portal. Now you can access parts of your medical record, email your doctor's office, and request medication refills online. 1. In your internet browser, go to [email or web address] 2. Click on the First Time User? Click Here link in the Sign In box.  You will see the New Member Sign Up page. 3. Enter your adRise Access Code exactly as it appears below. You will not need to use this code after youve completed the sign-up process. If you do not sign up before the expiration date, you must request a new code. · adRise Access Code: [email or web address] Expires: 10/17/2017  8:27 AM 
 
4. Enter the last four digits of your Social Security Number (xxxx) and Date of Birth (mm/dd/yyyy) as indicated and click Submit. You will be taken to the next sign-up page. 5. Create a adRise ID. This will be your adRise login ID and cannot be changed, so think of one that is secure and easy to remember. 6. Create a adRise password. You can change your password at any time. 7. Enter your Password Reset Question and Answer. This can be used at a later time if you forget your password. 8. Enter your e-mail address. You will receive e-mail notification when new information is available in [address] 9. Click Sign Up. You can now view and download portions of your medical record. 10. Click the Download Summary menu link to download a portable copy of your medical information. If you have questions, please visit the Frequently Asked Questions section of the adRise website. Remember, adRise is NOT to be used for urgent needs. For medical emergencies, dial 911. Now available from your iPhone and Android! Please provide this summary of care documentation to your next provider. Your primary care clinician is listed as [place] Arellano. If you have any questions after today's visit, please call [phone].

## 2017-09-21 NOTE — PROGRESS NOTES
[facility] [place] [facility]:  9/28/2017, MRN: [id number]      Gladis Aase is a 70 y.o. female and presents to clinic for Leg Pain (left  leg pain  2 weeks back of knee area )    Subjective:   Pt is a 79yo female with h/o sjogren's syndrome per EHR, hydronephrosis, autoimmune hepatitis, osteoporosis, hematuria, and atrial flutter. LLE pain: Sx have been present off/on for yrs but has become more frequent over the past 2 wks. Pain spreads from her knee to her groin. Pain is mostly along her medial thigh. She has some associated lower back pain as well. No h/o trauma. No alleviating factors are known. No weakness/paresthesias. Pt is concerned she may have a blood clot given underlying atrial flutter dx. She is off xarelto - after a bout of hematuria in 2014. She has no h/o traveling/increased sedentary lifestyle recently. No edema. No SOB. Pain is \"achy. \"     Patient Active Problem List    Diagnosis Date Noted    Osteoporosis 07/19/2017    Sjogren's syndrome ([facility] 10/05/2016    Autoimmune hepatitis ([facility] 07/13/2015    History of hematuria 11/21/2014    Atrial flutter (HCC) 10/28/2014       Current Outpatient Prescriptions   Medication Sig Dispense Refill    metoprolol succinate (TOPROL-XL) 25 mg XL tablet TAKE 1 TABLET BY MOUTH TWICE DAILY 60 Tab 3    metoprolol tartrate (LOPRESSOR) 25 mg tablet  1 tablet daily as needed 30 Tab 5    cyclobenzaprine (FLEXERIL) 10 mg tablet Take 10 mg by mouth daily as needed. 1 tab at bedtime  Indications: FIBROMYALGIA      calcium 500 mg Tab Take 500 mg by mouth. BID      multivitamin (ONE A DAY) tablet Take 1 Tab by mouth daily.  cycloSPORINE (RESTASIS) 0.05 % ophthalmic emulsion Administer 1 drop to both eyes daily.  FLAXSEED OIL (OMEGA 3 PO) Take  by mouth daily as needed.          Allergies   Allergen Reactions    Adhesive Hives    Mercurochrome [Merbromin] Rash    Merthiolate (Thimerosal) Rash    Nasacort [Triamcinolone Acetonide] Other (comments) congestion    Pcn [Penicillins] Rash       Past Medical History:   Diagnosis Date    Abdominal pain     Autoimmune hepatitis ([address] 4-24-15    Dr Lashawn Pond, s/p liver biopsy    Basal cell cancer     BPPV (benign paroxysmal positional vertigo)     Dr Garima Mcgovern Cardiac echocardiogram 11/11/2014    EF 50-55%. No WMA. Gr 1 DDfx. RVSP 26 mmHg.       Chronic lung disease     Degenerative disc disease, lumbar     Enlarged lymph nodes     celiac artery-follow up CT showed improvement in MAY 2015    Fracture     GERD (gastroesophageal reflux disease)     HCV (hepatitis C virus)     Hematuria     gross with xarelto-Dr Rocio Morales, Dr Nimisha Weeks: Oncology-Hematology-[facility]    Hemorrhage 1962 and 1963    Hepatitis C     treated    Hepatitis C 2002    Liver disease     Lumbar spondylosis with myelopathy     Ocular migraine     Osteoarthritis     Osteoporosis     Otosclerosis     Bilateral    Pinched nerve [id number]    C5 and C6    Scoliosis     Sjogren's syndrome ([address] 1999    SVT (supraventricular tachycardia) ([address] 10-28-14    Tachycardia     Transient global amnesia 9/4/07/, 4/12/12    x2    Wedging of vertebra ([address]     Dry needling completed       Past Surgical History:   Procedure Laterality Date    HX ADENOIDECTOMY      HX APPENDECTOMY      HX COLONOSCOPY  9/2013    HX CYST INCISION AND DRAINAGE Bilateral 1973    HX TONSILLECTOMY         Family History   Problem Relation Age of Onset    Cancer Mother 76     stomach    Stroke Maternal Grandmother     Heart Disease Brother     Cancer Brother      Lyphoma    Stroke Father     Arthritis-osteo Sister     Osteoporosis Sister     No Known Problems Maternal Grandfather     Osteoporosis Paternal Grandmother     Osteoporosis Paternal Grandfather     Diabetes Brother        Social History   Substance Use Topics    Smoking status: Former Smoker     Packs/day: 1.50     Years: 10.00     Types: Cigarettes     Quit date: 9/18/1973    Smokeless tobacco: Never Used    Alcohol use No       ROS   Review of Systems   Constitutional: Negative for chills and fever. HENT: Negative for ear pain and sore throat. Eyes: Negative for blurred vision and pain. Respiratory: Negative for shortness of breath. Cardiovascular: Negative for chest pain. Gastrointestinal: Negative for abdominal pain, blood in stool and melena. Genitourinary: Negative for dysuria and hematuria (not since 2014). Musculoskeletal: Positive for back pain, joint pain and myalgias. Skin: Negative for rash. Neurological: Negative for tingling, focal weakness and headaches. Endo/Heme/Allergies: Does not bruise/bleed easily. Psychiatric/Behavioral: Negative for substance abuse. Objective     Vitals:    09/21/17 1227   BP: 142/70   Pulse: 70   Resp: 18   Temp: 97.8 °F (36.6 °C)   SpO2: 98%   Weight: 122 lb (55.3 kg)   Height: 5' 3\" (1.6 m)   PainSc:   5   PainLoc: Leg       Physical Exam   Constitutional: She is oriented to person, place, and time and well-developed, well-nourished, and in no distress. HENT:   Head: Normocephalic and atraumatic. Right Ear: External ear normal.   Left Ear: External ear normal.   Nose: Nose normal.   Mouth/Throat: Oropharynx is clear and moist. No oropharyngeal exudate. Eyes: Conjunctivae and EOM are normal. Right eye exhibits no discharge. Left eye exhibits no discharge. No scleral icterus. Neck: Neck supple. Cardiovascular: Normal rate, regular rhythm, normal heart sounds and intact distal pulses. Exam reveals no gallop and no friction rub. No murmur heard. Pulmonary/Chest: Effort normal and breath sounds normal. No respiratory distress. She has no wheezes. She has no rales. Abdominal: Soft. Bowel sounds are normal. She exhibits no distension. There is no tenderness. There is no rebound and no guarding. Musculoskeletal: She exhibits no edema (BUE/BLE) or tenderness (BUE are NTTP).    RLE is NTTP; LLE is TTP along popliteal fossa - where a small circular nodule/cyst is present. No effusions are present. She has some discomfort with passive ROM along her left hip but none along her right hip. All spinous processes are NTTP. No paraspinal muscles are TTP. Lymphadenopathy:     She has no cervical adenopathy. Neurological: She is alert and oriented to person, place, and time. She exhibits normal muscle tone. Gait normal.   Skin: Skin is warm and dry. No erythema. Psychiatric: Affect normal.   Nursing note and vitals reviewed.       LABS   Data Review:   Lab Results   Component Value Date/Time    WBC 6.1 01/13/2017 11:47 AM    HGB 13.0 01/13/2017 11:47 AM    HCT 39.3 01/13/2017 11:47 AM    PLATELET 023 91/51/7205 11:47 AM    MCV 91.4 01/13/2017 11:47 AM       Lab Results   Component Value Date/Time    Sodium 142 01/13/2017 11:47 AM    Potassium 4.3 01/13/2017 11:47 AM    Chloride 105 01/13/2017 11:47 AM    CO2 28 01/13/2017 11:47 AM    Anion gap 9 01/13/2017 11:47 AM    Glucose 88 01/13/2017 11:47 AM    BUN 12 01/13/2017 11:47 AM    Creatinine 0.76 01/13/2017 11:47 AM    BUN/Creatinine ratio 16 01/13/2017 11:47 AM    GFR est AA >60 01/13/2017 11:47 AM    GFR est non-AA >60 01/13/2017 11:47 AM    Calcium 9.3 01/13/2017 11:47 AM       Lab Results   Component Value Date/Time    Cholesterol, total 185 11/30/2016 08:30 AM    HDL Cholesterol 74 11/30/2016 08:30 AM    LDL, calculated 92.6 11/30/2016 08:30 AM    VLDL, calculated 18.4 11/30/2016 08:30 AM    Triglyceride 92 11/30/2016 08:30 AM    CHOL/HDL Ratio 2.5 11/30/2016 08:30 AM       Assessment/Plan:   Left leg pain, hip pain, and sciatica?  -Ordering an ultrasound of her popliteal fossa given suspicion of underlying Baker's cyst.  We discussed the management of Baker's cyst.  The patient wants to treat this symptomatically pending results from her ultrasound study.  -We will rule out any underlying DVT with a left lower extremity PVL study.  -A left hip x-ray and lumbar x-ray series was ordered.  -The patient was to defer a referral to orthopedics for evaluation of the symptoms mentioned above pending the results of the studies ordered above. -She can take Tylenol as needed at this time for symptomatic relief    ORDERS:  - DUPLEX LOWER EXT VENOUS LEFT; Future  - US EXT NONVAS LT LTD; Future  - XR SPINE LUMB COMP W BEND; Future  - XR HIP LT W OR WO PELV 2-3 VWS; Future      Health Maintenance Due   Topic Date Due    INFLUENZA AGE 9 TO ADULT  08/01/2017    BREAST CANCER SCRN MAMMOGRAM  12/12/2017     Lab review: labs are reviewed in the EHR    I have discussed the diagnosis with the patient and the intended plan as seen in the above orders. The patient has received an after-visit summary and questions were answered concerning future plans. I have discussed medication side effects and warnings with the patient as well. I have reviewed the plan of care with the patient, accepted their input and they are in agreement with the treatment goals. All questions were answered. The patient understands the plan of care. Handouts provided today with above information. Pt instructed if symptoms worsen to call the office or report to the ED for continued care. Greater than 50% of the visit time was spent in counseling and/or coordination of care. Follow-up Disposition:  Return if symptoms worsen or fail to improve.     Davis Cuevas MD

## 2017-09-27 ENCOUNTER — HOSPITAL ENCOUNTER (OUTPATIENT)
Dept: VASCULAR SURGERY | Age: 72
Discharge: HOME | End: 2017-09-27
Attending: INTERNAL MEDICINE
Payer: MEDICARE

## 2017-09-27 ENCOUNTER — TELEPHONE (OUTPATIENT)
Dept: INTERNAL MEDICINE CLINIC | Age: 72
End: 2017-09-27

## 2017-09-27 ENCOUNTER — HOSPITAL ENCOUNTER (OUTPATIENT)
Dept: ULTRASOUND IMAGING | Age: 72
Discharge: HOME | End: 2017-09-27
Attending: INTERNAL MEDICINE
Payer: MEDICARE

## 2017-09-27 DIAGNOSIS — M79.605 LEFT LEG PAIN: ICD-10-CM

## 2017-09-27 PROCEDURE — 93971 EXTREMITY STUDY: CPT

## 2017-09-27 PROCEDURE — 76882 US LMTD JT/FCL EVL NVASC XTR: CPT

## 2017-09-29 ENCOUNTER — TELEPHONE (OUTPATIENT)
Dept: INTERNAL MEDICINE CLINIC | Age: 72
End: 2017-09-29

## 2017-09-29 NOTE — PROCEDURES
[facility]  *** FINAL REPORT ***    Name: Denisha Roberts  MRN: [id number]    Outpatient  : 02 Oct 1945  HIS Order #: [id number]  [id number] [facility] Visit #: [id number]  Date: 27 Sep 2017    TYPE OF TEST: Peripheral Venous Testing    REASON FOR TEST  Pain in limb, Limb swelling    Left Leg:-  Deep venous thrombosis:           No  Superficial venous thrombosis:    Not examined  Deep venous insufficiency:        Not examined  Superficial venous insufficiency: Not examined      INTERPRETATION/FINDINGS  Duplex images were obtained using 2-D gray scale, color flow, and  spectral Doppler analysis. Left leg :  1. Deep vein(s) visualized include the common femoral, proximal  femoral, mid femoral, distal femoral, popliteal(above knee),  popliteal(fossa), popliteal(below knee), posterior tibial and peroneal   veins. 2. No evidence of deep venous thrombosis detected in the veins  visualized. 3. No evidence of deep vein thrombosis in the contralateral common  femoral vein. 4. Superficial vein(s) visualized include the great saphenous vein. ADDITIONAL COMMENTS    I have personally reviewed the data relevant to the interpretation of  this  study. TECHNOLOGIST: Charan Hudson, [facility], RVT/  Signed: 2017 02:03 PM    PHYSICIAN: Aiyana Stephenson.  Klaus Wilburn MD  Signed: 10/02/2017 08:31 AM

## 2017-10-03 NOTE — TELEPHONE ENCOUNTER
Brianna Sheets MD   [facility] Nurses 18 hours ago (4:21 PM)                 Please let the pt know that her xrays show no abnormalities along her hip jt. She has no new findings along her lower back. She has known lumbar disc disease, foraminal narrowing, mild scoliosis, and calcified uterine fibroids. Meanwhile, there is no evidence of DVT per her PVL study. There is a small Baker's cyst that ultrasound picked up. If her pain is worsening, then we should send her to Orthopedics. Please let me know if she wants to be referred to Orthopedics for evaluation/treatment.      Respectfully,   Dr. Brock Carrasco   [facility], [address] [place] [address] Str.   Phone: [phone]   Fax: [phone] (Routing comment

## 2017-11-01 NOTE — PROGRESS NOTES
1. Have you been to the ER, urgent care clinic since your last visit? Hospitalized since your last visit? No     2. Have you seen or consulted any other health care providers outside of the [address] [place] [address] since your last visit? Include any pap smears or colon screening.  No

## 2017-11-01 NOTE — MR AVS SNAPSHOT
Visit Information Date & Time Provider Department Dept. Phone Encounter #  
 11/1/2017  8:00 AM Judith Munoz MD [facility] [address] [id number] Your Appointments 1/19/2018  8:00 AM  
Office Visit with Junior Alvarez MD  
[facility]) Appt Note: 6 month f/u  
 [address] [facility], [address] [place] [address] [place]  
  
   
 [address], [address] Upcoming Health Maintenance Date Due INFLUENZA AGE 9 TO ADULT 8/1/2017 Pneumococcal 65+ Low/Medium Risk (2 of 2 - PPSV23) 10/5/2017 BREAST CANCER SCRN MAMMOGRAM 12/12/2017 MEDICARE YEARLY EXAM 11/8/2017 COLONOSCOPY 9/24/2018 GLAUCOMA SCREENING Q2Y 2/23/2019 DTaP/Tdap/Td series (2 - Td) 10/5/2026 Allergies as of 11/1/2017  Review Complete On: 9/28/2017 By: Junior Alvarez MD  
  
 Severity Noted Reaction Type Reactions Adhesive High 09/18/2013    Hives Mercurochrome [Merbromin] High 09/18/2013    Rash Merthiolate (Thimerosal) High 09/18/2013    Rash Nasacort [Triamcinolone Acetonide] High 09/18/2013    Other (comments)  
 congestion Pcn [Penicillins] Medium 09/18/2013    Rash Current Immunizations  Reviewed on 8/24/2017 No immunizations on file. Not reviewed this visit You Were Diagnosed With   
  
 Codes Comments Typical atrial flutter (HCC)    -  Primary ICD-10-CM: I48.3 ICD-9-CM: 427.32 Vitals BP Pulse Height(growth percentile) Weight(growth percentile) SpO2 BMI  
 138/64 70 5' 3\" (1.6 m) 121 lb (54.9 kg) 98% 21.43 kg/m2 OB Status Smoking Status Postmenopausal Former Smoker Vitals History BMI and BSA Data Body Mass Index Body Surface Area  
 21.43 kg/m 2 1.56 m 2 Preferred Pharmacy Pharmacy Name Phone  [address] [facility] [phone] Your Updated Medication List  
  
   
This list is accurate as of: 11/1/17  8:17 AM.  Always use your most recent med list.  
  
  
  
  
 calcium 500 mg Tab Take 500 mg by mouth. BID FLEXERIL 10 mg tablet Generic drug:  cyclobenzaprine Take 10 mg by mouth daily as needed. 1 tab at bedtime  Indications: FIBROMYALGIA  
  
 metoprolol succinate 25 mg XL tablet Commonly known as:  TOPROL-XL  
TAKE 1 TABLET BY MOUTH TWICE DAILY  
  
 metoprolol tartrate 25 mg tablet Commonly known as:  LOPRESSOR  
1 tablet daily as needed  
  
 multivitamin tablet Commonly known as:  ONE A DAY Take 1 Tab by mouth daily. OMEGA 3 PO Take  by mouth daily as needed. RESTASIS 0.05 % ophthalmic emulsion Generic drug:  cycloSPORINE Administer 1 drop to both eyes daily. We Performed the Following AMB POC EKG ROUTINE W/ 12 LEADS, INTER & REP [19184 CPT(R)] Introducing [facility]! Mikayla Oneil introduces [employer] patient portal. Now you can access parts of your medical record, email your doctor's office, and request medication refills online. 1. In your internet browser, go to [email or web address]. [email or web address] 2. Click on the First Time User? Click Here link in the Sign In box. You will see the New Member Sign Up page. 3. Enter your [employer] Access Code exactly as it appears below. You will not need to use this code after youve completed the sign-up process. If you do not sign up before the expiration date, you must request a new code. · [employer] Access Code: [email or web address] Expires: 1/30/2018  8:17 AM 
 
4. Enter the last four digits of your Social Security Number (xxxx) and Date of Birth (mm/dd/yyyy) as indicated and click Submit. You will be taken to the next sign-up page. 5. Create a [employer] ID. This will be your [employer] login ID and cannot be changed, so think of one that is secure and easy to remember. 6. Create a [facility] password. You can change your password at any time. 7. Enter your Password Reset Question and Answer. This can be used at a later time if you forget your password. 8. Enter your e-mail address. You will receive e-mail notification when new information is available in [address] 9. Click Sign Up. You can now view and download portions of your medical record. 10. Click the Download Summary menu link to download a portable copy of your medical information. If you have questions, please visit the Frequently Asked Questions section of the [employer] website. Remember, [employer] is NOT to be used for urgent needs. For medical emergencies, dial 911. Now available from your iPhone and Android! Please provide this summary of care documentation to your next provider. Your primary care clinician is listed as Fatmata Mojica. If you have any questions after today's visit, please call [phone].

## 2017-11-01 NOTE — PROGRESS NOTES
HISTORY OF PRESENT ILLNESS  Denice García is a 67 y.o. female. Palpitations    Pertinent negatives include no fever, no chest pain, no claudication, no orthopnea, no PND, no abdominal pain, no nausea, no vomiting, no headaches, no dizziness, no cough and no shortness of breath. Patient presents for a follow-up office visit. The patient has a past medical history significant for supraventricular tachycardia. She was seen in the emergency room In November 2014 with acute onset palpitations and chest pain. Her initial EKG showed atrial flutter with a one-to-one AV conduction and heart rate of 240 beats per minute. Patient was treated with several rounds of adenosine and then diltiazem. The patient's heart rate improved to 130 beats were, however she remained in atrial flutter with borderline low pressure, so she underwent elective external cardioversion, which successfully converted her back to sinus rhythm with a single 50 J shock. The patient subsequently underwent an echocardiogram in November 2014 which showed a low-normal left ventricular ejection fraction 44-65%, grade 1 diastolic dysfunction, and no significant valvular heart disease. She was briefly on anticoagulation, but I was stopped because of her underlying liver disease. Patient also had an episode of what sounds like atrial fibrillation in September 2016 which converted with both IV Lopressor and IV diltiazem. The patient's most recent episode occurred in December 2016, the symptoms awoke her from sleep and lasted for several hours before seeking medical attention in the emergency department. In the emergency room, she was discovered to be in atrial flutter with 2-1 conduction and a heart rate of 135. She received a single dose of IV diltiazem which converted her to sinus rhythm in less than an hour. She subsequently underwent a follow-up echocardiogram in December 2016 which was unchanged compared to her previous study.   She then underwent a pharmacologic nuclear stress test which was a low risk study. Patient was last seen in the office approximately 7 months ago. Since last visit. She states she had one episode of prolonged tachycardia which lasted approximately 30 minutes. She states she tried multiple vagal maneuvers, took an extra dose of short acting metoprolol and the symptoms broke after 30 minutes. She did not require any additional medical treatment. Otherwise, she states she has been feeling well. No chest pain, shortness of breath, dizziness or syncope. No major change in her activity tolerance. Past Medical History:   Diagnosis Date    Abdominal pain     Autoimmune hepatitis ([facility] 4-24-15    Dr Agata Bee, s/p liver biopsy    Basal cell cancer     BPPV (benign paroxysmal positional vertigo)     Dr Mary Ibarra Cardiac echocardiogram 11/11/2014    EF 50-55%. No WMA. Gr 1 DDfx. RVSP 26 mmHg.       Chronic lung disease     Degenerative disc disease, lumbar     Enlarged lymph nodes     celiac artery-follow up CT showed improvement in MAY 2015    Fracture     GERD (gastroesophageal reflux disease)     HCV (hepatitis C virus)     Hematuria     gross with xarelto-Dr Mesha Willett, Dr Parr Leisure: Oncology-Hematology-[facility]    Hemorrhage 1962 and 1963    Hepatitis C     treated    Hepatitis C 2002    Liver disease     Lumbar spondylosis with myelopathy     Ocular migraine     Osteoarthritis     Osteoporosis     Otosclerosis     Bilateral    Pinched nerve [id number]    C5 and C6    Scoliosis     Sjogren's syndrome ([facility] 1999    SVT (supraventricular tachycardia) ([facility] 10-28-14    Tachycardia     Transient global amnesia 9/4/07/, 4/12/12    x2    Wedging of vertebra (HCC)     Dry needling completed      Current Outpatient Prescriptions   Medication Sig Dispense Refill    metoprolol tartrate (LOPRESSOR) 25 mg tablet  1 tablet daily as needed 30 Tab 5    cyclobenzaprine (FLEXERIL) 10 mg tablet Take 10 mg by mouth daily as needed. 1 tab at bedtime  Indications: FIBROMYALGIA      calcium 500 mg Tab Take 500 mg by mouth. BID      multivitamin (ONE A DAY) tablet Take 1 Tab by mouth daily.  cycloSPORINE (RESTASIS) 0.05 % ophthalmic emulsion Administer 1 drop to both eyes daily.  FLAXSEED OIL (OMEGA 3 PO) Take  by mouth daily as needed.  metoprolol succinate (TOPROL-XL) 25 mg XL tablet TAKE 1 TABLET BY MOUTH TWICE DAILY 60 Tab 3       Allergies   Allergen Reactions    Adhesive Hives    Mercurochrome [Merbromin] Rash    Merthiolate (Thimerosal) Rash    Nasacort [Triamcinolone Acetonide] Other (comments)     congestion    Pcn [Penicillins] Rash      Social History   Substance Use Topics    Smoking status: Former Smoker     Packs/day: 1.50     Years: 10.00     Types: Cigarettes     Quit date: 9/18/1973    Smokeless tobacco: Never Used    Alcohol use No                Review of Systems   Constitutional: Negative for chills, fever and weight loss. HENT: Negative for nosebleeds. Eyes: Negative for blurred vision and double vision. Respiratory: Negative for cough, shortness of breath and wheezing. Cardiovascular: Positive for palpitations. Negative for chest pain, orthopnea, claudication, leg swelling and PND. Gastrointestinal: Negative for abdominal pain, heartburn, nausea and vomiting. Genitourinary: Negative for dysuria and hematuria. Musculoskeletal: Negative for falls and myalgias. Skin: Negative for rash. Neurological: Negative for dizziness, focal weakness and headaches. Endo/Heme/Allergies: Does not bruise/bleed easily. Psychiatric/Behavioral: Negative for substance abuse. Visit Vitals    /64    Pulse 70    Ht 5' 3\" (1.6 m)    Wt 54.9 kg (121 lb)    SpO2 98%    BMI 21.43 kg/m2      Physical Exam   Constitutional: She is oriented to person, place, and time. She appears well-developed and well-nourished. HENT:   Head: Normocephalic and atraumatic. Eyes: Conjunctivae are normal.   Neck: Neck supple. No JVD present. Carotid bruit is not present. Cardiovascular: Normal rate, regular rhythm, S1 normal, S2 normal and normal pulses. Exam reveals no gallop. No murmur heard. Pulmonary/Chest: Effort normal and breath sounds normal. She has no wheezes. She has no rales. Abdominal: Soft. Bowel sounds are normal. There is no tenderness. Musculoskeletal: She exhibits no edema. Neurological: She is alert and oriented to person, place, and time. Skin: Skin is warm and dry. EKG: Normal sinus rhythm, Borderline short DC interval, no delta waves, normal axis, Normal QTc interval, nonspecific T-wave abnormality. Occasional atrial premature contraction, compared to the previous EKG, no significant interval change. ASSESSMENT and PLAN    Paroxysmal atrial flutter. Initial episode in 2014 when the patient initially presented with a heart rate of 240 bpm, likely representing one-to-one conduction. She underwent an electrical cardioversion in 2014. Another episode of atrial flutter occurred in December 2016 prompting an emergency room visit. The tachycardia broke after receiving Cardizem 10 mg intravenously. No recurrent palpitations since that episode. She is now taking a higher dose of metoprolol XL at 25 mg twice daily. She is also immediate release metoprolol to help with breakthrough symptoms. She has not been interested in an atrial flutter ablation in the past.  She is not on any anticoagulants or antiplatelet agents because of easy bruising and bleeding from her chronic liver disease. She had one episode of palpitations since last visit which lasted approximately 30 minutes. I suspect this may have been either a recurrent episode of atrial flutter or another SVT. Paroxysmal atrial fibrillation. This has been documented once in the past as well. Autoimmune hepatitis. Patient finished her treatment for this.   At this point I still feel the risk of starting an anticoagulant outweighs any benefit. Followup in 6 months, sooner if needed.

## 2017-11-17 NOTE — PATIENT INSTRUCTIONS
Well Visit, Over 72: Care Instructions  Your Care Instructions    Physical exams can help you stay healthy. Your doctor has checked your overall health and may have suggested ways to take good care of yourself. He or she also may have recommended tests. At home, you can help prevent illness with healthy eating, regular exercise, and other steps. Follow-up care is a key part of your treatment and safety. Be sure to make and go to all appointments, and call your doctor if you are having problems. It's also a good idea to know your test results and keep a list of the medicines you take. How can you care for yourself at home? · Reach and stay at a healthy weight. This will lower your risk for many problems, such as obesity, diabetes, heart disease, and high blood pressure. · Get at least 30 minutes of exercise on most days of the week. Walking is a good choice. You also may want to do other activities, such as running, swimming, cycling, or playing tennis or team sports. · Do not smoke. Smoking can make health problems worse. If you need help quitting, talk to your doctor about stop-smoking programs and medicines. These can increase your chances of quitting for good. · Protect your skin from too much sun. When you're outdoors from 10 a.m. to 4 p.m., stay in the shade or cover up with clothing and a hat with a wide brim. Wear sunglasses that block UV rays. Even when it's cloudy, put broad-spectrum sunscreen (SPF 30 or higher) on any exposed skin. · See a dentist one or two times a year for checkups and to have your teeth cleaned. · Wear a seat belt in the car. · Limit alcohol to 2 drinks a day for men and 1 drink a day for women. Too much alcohol can cause health problems. Follow your doctor's advice about when to have certain tests. These tests can spot problems early. For men and women  · Cholesterol.  Your doctor will tell you how often to have this done based on your overall health and other things that can increase your risk for heart attack and stroke. · Blood pressure. Have your blood pressure checked during a routine doctor visit. Your doctor will tell you how often to check your blood pressure based on your age, your blood pressure results, and other factors. · Diabetes. Ask your doctor whether you should have tests for diabetes. · Vision. Experts recommend that you have yearly exams for glaucoma and other age-related eye problems. · Hearing. Tell your doctor if you notice any change in your hearing. You can have tests to find out how well you hear. · Colon cancer tests. Keep having colon cancer tests as your doctor recommends. You can have one of several types of tests. · Heart attack and stroke risk. At least every 4 to 6 years, you should have your risk for heart attack and stroke assessed. Your doctor uses factors such as your age, blood pressure, cholesterol, and whether you smoke or have diabetes to show what your risk for a heart attack or stroke is over the next 10 years. · Osteoporosis. Talk to your doctor about whether you should have a bone density test to find out whether you have thinning bones. Also ask your doctor about whether you should take calcium and vitamin D supplements. For women  · Pap test and pelvic exam. You may no longer need a Pap test. Talk with your doctor about whether to stop or continue to have Pap tests. · Breast exam and mammogram. Ask how often you should have a mammogram, which is an X-ray of your breasts. A mammogram can spot breast cancer before it can be felt and when it is easiest to treat. · Thyroid disease. Talk to your doctor about whether to have your thyroid checked as part of a regular physical exam. Women have an increased chance of a thyroid problem. For men  · Prostate exam. Talk to your doctor about whether you should have a blood test (called a PSA test) for prostate cancer.  Experts disagree on whether men should have this test. Some experts recommend that you discuss the benefits and risks of the test with your doctor. · Abdominal aortic aneurysm. Ask your doctor whether you should have a test to check for an aneurysm. You may need a test if you ever smoked or if your parent, brother, sister, or child has had an aneurysm. When should you call for help? Watch closely for changes in your health, and be sure to contact your doctor if you have any problems or symptoms that concern you. Where can you learn more? Go to http://pratik-niki.info/. Enter C139 in the search box to learn more about \"Well Visit, Over 65: Care Instructions. \"  Current as of: May 12, 2017  Content Version: 11.4  © 5990-8663 [employer]. Care instructions adapted under license by [employer] (which disclaims liability or warranty for this information). If you have questions about a medical condition or this instruction, always ask your healthcare professional. [place] [address] any warranty or liability for your use of this information. Health Maintenance Due   Topic Date Due    MEDICARE YEARLY EXAM  11/08/2017    BREAST CANCER SCRN MAMMOGRAM  12/12/2017          Albumin Urine Test: About This Test  What is it? An albumin urine test checks urine for a protein called albumin. This protein is normally found in the blood. When the kidneys are damaged, small amounts of albumin leak into the urine. This is called albuminuria. If the amount of albumin is very small, but still abnormal, it is called microalbuminuria. You might provide a urine sample for your doctor during a visit. Your doctor might also ask you for a one-time sample at home or over a specific period of time, such as over 4 hours or 24 hours. Your doctor will tell you what to do. Why is this test done? This test is done to check for albumin in the urine. It helps tell your doctor how well your kidneys are working.  This test is done most often to check the kidneys in people with diabetes. Other conditions also cause albuminuria. These conditions include high blood pressure, heart failure, and cirrhosis. The sooner your doctor knows you have kidney damage, the more your doctor can do to protect your kidneys. How can you prepare for the test?  · Do not exercise just before the test.  · Tell your doctor if you are having your period or have vaginal discharge. · Tell your doctor about all the nonprescription and prescription medicines and herbs or other supplements you take. Some of these can affect the results of this test.  What happens before the test?  · Your doctor or the lab likely will give you the container you need to hold the urine. You will get instructions on when and how to collect the urine. This might be a one-time sample or a number of samples over a period of time. What happens during the test?  One-time urine collection  · Wash your hands before you start. · If the collection cup you are given has a lid, remove it carefully. Set it down with the inner surface up. Do not touch the inside of the cup with your fingers. · Clean the area around your genitals. ¨ For men: Pull back the foreskin, if present, and clean the head of the penis with medicated towelettes or swabs. ¨ For women: Spread open the genital folds of skin with one hand. Then use medicated towelettes or swabs in your other hand to clean the area where urine comes out (the urethra). Wipe the area from front to back. · Start urinating into the toilet or urinal. A woman should hold apart the genital folds of skin while she urinates. · After the urine has flowed for several seconds, place the cup into the urine stream. Collect about 2 fluid ounces of this \"midstream\" urine without stopping your flow of urine. · Don't touch the rim of the cup to your genital area. Don't get toilet paper, pubic hair, stool (feces), menstrual blood, or anything else in the urine sample.   · [employer] urinating into the toilet or urinal.  · Carefully replace and tighten the lid on the cup, and then return it to the lab. If you are collecting the urine at home and can't get it to the lab in an hour, refrigerate it. Urine collection over time  You collect your urine for a period of time, such as over 4 or 24 hours. · You start collecting your urine in the morning. When you first get up, empty your bladder. But do not save this urine. Write down the time that you began. · For the set period of time, collect all your urine. Urinate into a small, clean container. Then pour the urine into the large container. Don't touch the inside of the container with your fingers. · Keep the collected urine in the refrigerator for the collection time. Empty your bladder for the last time at or just before the end of the collection period. Add this urine to the large container. Then write down the time. What else should you know about the test?  · If your results are higher than normal, your doctor may check your urine more often to watch for kidney damage. · If your test shows that you may have kidney damage, you may get other tests. What happens after the test?  · Follow your doctor's instructions for taking the urine to the doctor's office or lab. · You can go back to your usual activities right away. When should you call for help? Watch closely for changes in your health, and be sure to contact your doctor if you have any problems. Follow-up care is a key part of your treatment and safety. Be sure to make and go to all appointments, and call your doctor if you are having problems. It's also a good idea to keep a list of the medicines you take. Ask your doctor when you can expect to have your test results. Where can you learn more? Go to http://pratik-niki.info/. Enter L729 in the search box to learn more about \"Albumin Urine Test: About This Test.\"  Current as of:  May 12, 2017  Content Version: 11.4  © 4692-6736 [employer]. Care instructions adapted under license by [employer] (which disclaims liability or warranty for this information). If you have questions about a medical condition or this instruction, always ask your healthcare professional. [address] any warranty or liability for your use of this information.

## 2017-11-17 NOTE — MR AVS SNAPSHOT
Visit Information Date & Time Provider Department Dept. Phone Encounter #  
 11/17/2017 10:00 AM Stella Miramontes MD [facility] [phone] Follow-up Instructions Return in about 8 weeks (around 1/12/2018) for lab results. Your Appointments 1/12/2018  8:30 AM  
Office Visit with Stella Miramontes MD  
[facility] Appt Note: 2 month f/u  
 [address] [facility] [address], [address] [address] [address] [place]  
  
   
 [address], [address]  
  
    
 1/19/2018  8:00 AM  
Office Visit with Stella Miramontes MD  
[facility]) Appt Note: 6 month f/u  
 [address] [facility] [address], [address] [facility]  
[phone]  
  
    
 5/4/2018  8:40 AM  
Follow Up with Chrissy Boyd MD  
[facility] \[facility]\"" ([facility]) Appt Note: 6 month follow up [place] [address] [address] [id number] [phone] [address] [place] [address] Upcoming Health Maintenance Date Due  
 BREAST CANCER SCRN MAMMOGRAM 12/12/2017 COLONOSCOPY 9/24/2018 MEDICARE YEARLY EXAM 11/18/2018 GLAUCOMA SCREENING Q2Y 2/23/2019 DTaP/Tdap/Td series (2 - Td) 10/5/2026 Allergies as of 11/17/2017  Review Complete On: 11/17/2017 By: Stella Miramontes MD  
  
 Severity Noted Reaction Type Reactions Adhesive High 09/18/2013    Hives Mercurochrome [Merbromin] High 09/18/2013    Rash Merthiolate (Thimerosal) High 09/18/2013    Rash Nasacort [Triamcinolone Acetonide] High 09/18/2013    Other (comments)  
 congestion Pcn [Penicillins] Medium 06/01/2011    Rash, Other (comments) Azatadine Maleate  06/21/2011    Other (comments) Neomycin-bacitracin-polymyxin  06/21/2011    Other (comments) Current Immunizations  Reviewed on 8/24/2017 No immunizations on file. Not reviewed this visit You Were Diagnosed With   
  
 Codes Comments Microalbuminuria    -  Primary ICD-10-CM: R80.9 ICD-9-CM: 791.0 History of hematuria     ICD-10-CM: Z87.448 ICD-9-CM: V13.09 AGUSTIN (acute kidney injury) ([facility]     ICD-10-CM: N17.9 ICD-9-CM: 584.9 Sjogren's syndrome, with unspecified organ involvement ([facility].)     ICD-10-CM: M35.00 ICD-9-CM: 710.2 Autoimmune hepatitis ([facility].)     ICD-10-CM: K75.4 ICD-9-CM: 571.42 Screening for hyperlipidemia     ICD-10-CM: Z13.220 ICD-9-CM: V77.91 Vitals BP Pulse Temp Resp Height(growth percentile) Weight(growth percentile) 135/68 (BP 1 Location: Right arm, BP Patient Position: Sitting) 61 96.9 °F (36.1 °C) (Oral) 12 5' 3\" (1.6 m) 119 lb 6.4 oz (54.2 kg) SpO2 BMI OB Status Smoking Status 96% 21.15 kg/m2 Postmenopausal Former Smoker BMI and BSA Data Body Mass Index Body Surface Area  
 21.15 kg/m 2 1.55 m 2 Preferred Pharmacy Pharmacy Name Phone [address], [address] [place] [address] [facility] [phone] Your Updated Medication List  
  
   
This list is accurate as of: 11/17/17 10:41 AM.  Always use your most recent med list.  
  
  
  
  
 calcium 500 mg Tab Take 500 mg by mouth. BID FLEXERIL 10 mg tablet Generic drug:  cyclobenzaprine Take 10 mg by mouth daily as needed. 1 tab at bedtime  Indications: FIBROMYALGIA  
  
 metoprolol succinate 25 mg XL tablet Commonly known as:  TOPROL-XL  
TAKE 1 TABLET BY MOUTH TWICE DAILY  
  
 metoprolol tartrate 25 mg tablet Commonly known as:  LOPRESSOR  
1 tablet daily as needed  
  
 multivitamin tablet Commonly known as:  ONE A DAY Take 1 Tab by mouth daily. OMEGA 3 PO Take  by mouth daily as needed. RESTASIS 0.05 % ophthalmic emulsion Generic drug:  cycloSPORINE Administer 1 drop to both eyes daily. We Performed the Following REFERRAL TO NEPHROLOGY [KKH44 Custom] Follow-up Instructions Return in about 8 weeks (around 1/12/2018) for lab results. To-Do List   
 11/17/2017 Lab:  ARTHUR COMPREHENSIVE PANEL   
  
 11/17/2017 Lab:  C REACTIVE PROTEIN, QT   
  
 11/17/2017 Lab:  CBC WITH AUTOMATED DIFF   
  
 11/17/2017 Lab:  CYCLIC CITRUL PEPTIDE AB, IGG Around 11/17/2017 Lab:  DNA AB, DOUBLE STRANDED, IGG   
  
 11/17/2017 Lab:  HCV AB W/RFLX TO SUNDEEP Around 11/17/2017 Lab:  HEP B SURFACE AG   
  
 11/17/2017 Lab:  METABOLIC PANEL, COMPREHENSIVE   
  
 11/17/2017 Lab:  PROTEIN, URINE, 24 HR   
  
 11/17/2017 Lab:  RNP ANTIBODIES   
  
 11/17/2017 Lab:  URINALYSIS W/ RFLX MICROSCOPIC   
  
 11/17/2017 Imaging:  US RETROPERITONEUM COMP Referral Information Referral ID Referred By Referred To  
  
 [id number] Soraida Aceves Not Available Visits Status Start Date End Date 1 New Request 11/17/17 11/17/18 If your referral has a status of pending review or denied, additional information will be sent to support the outcome of this decision. Patient Instructions Well Visit, Over 72: Care Instructions Your Care Instructions Physical exams can help you stay healthy. Your doctor has checked your overall health and may have suggested ways to take good care of yourself. He or she also may have recommended tests. At home, you can help prevent illness with healthy eating, regular exercise, and other steps. Follow-up care is a key part of your treatment and safety. Be sure to make and go to all appointments, and call your doctor if you are having problems. It's also a good idea to know your test results and keep a list of the medicines you take. How can you care for yourself at home? · Reach and stay at a healthy weight. This will lower your risk for many problems, such as obesity, diabetes, heart disease, and high blood pressure. · Get at least 30 minutes of exercise on most days of the week. Walking is a good choice. You also may want to do other activities, such as running, swimming, cycling, or playing tennis or team sports. · Do not smoke. Smoking can make health problems worse. If you need help quitting, talk to your doctor about stop-smoking programs and medicines. These can increase your chances of quitting for good. · Protect your skin from too much sun. When you're outdoors from 10 a.m. to 4 p.m., stay in the shade or cover up with clothing and a hat with a wide brim. Wear sunglasses that block UV rays. Even when it's cloudy, put broad-spectrum sunscreen (SPF 30 or higher) on any exposed skin. · See a dentist one or two times a year for checkups and to have your teeth cleaned. · Wear a seat belt in the car. · Limit alcohol to 2 drinks a day for men and 1 drink a day for women. Too much alcohol can cause health problems. Follow your doctor's advice about when to have certain tests. These tests can spot problems early. For men and women · Cholesterol. Your doctor will tell you how often to have this done based on your overall health and other things that can increase your risk for heart attack and stroke. · Blood pressure. Have your blood pressure checked during a routine doctor visit. Your doctor will tell you how often to check your blood pressure based on your age, your blood pressure results, and other factors. · Diabetes. Ask your doctor whether you should have tests for diabetes. · Vision. Experts recommend that you have yearly exams for glaucoma and other age-related eye problems. · Hearing. Tell your doctor if you notice any change in your hearing. You can have tests to find out how well you hear. · Colon cancer tests. Keep having colon cancer tests as your doctor recommends. You can have one of several types of tests. · Heart attack and stroke risk.  At least every 4 to 6 years, you should have your risk for heart attack and stroke assessed. Your doctor uses factors such as your age, blood pressure, cholesterol, and whether you smoke or have diabetes to show what your risk for a heart attack or stroke is over the next 10 years. · Osteoporosis. Talk to your doctor about whether you should have a bone density test to find out whether you have thinning bones. Also ask your doctor about whether you should take calcium and vitamin D supplements. For women · Pap test and pelvic exam. You may no longer need a Pap test. Talk with your doctor about whether to stop or continue to have Pap tests. · Breast exam and mammogram. Ask how often you should have a mammogram, which is an X-ray of your breasts. A mammogram can spot breast cancer before it can be felt and when it is easiest to treat. · Thyroid disease. Talk to your doctor about whether to have your thyroid checked as part of a regular physical exam. Women have an increased chance of a thyroid problem. For men · Prostate exam. Talk to your doctor about whether you should have a blood test (called a PSA test) for prostate cancer. Experts disagree on whether men should have this test. Some experts recommend that you discuss the benefits and risks of the test with your doctor. · Abdominal aortic aneurysm. Ask your doctor whether you should have a test to check for an aneurysm. You may need a test if you ever smoked or if your parent, brother, sister, or child has had an aneurysm. When should you call for help? Watch closely for changes in your health, and be sure to contact your doctor if you have any problems or symptoms that concern you. Where can you learn more? Go to http://pratik-niki.info/. Enter M007 in the search box to learn more about \"Well Visit, Over 65: Care Instructions. \" Current as of: May 12, 2017 Content Version: 11.4 © 5633-9005 Healthwise, Incorporated.  Care instructions adapted under license by [employer] (which disclaims liability or warranty for this information). If you have questions about a medical condition or this instruction, always ask your healthcare professional. [address] any warranty or liability for your use of this information. Health Maintenance Due Topic Date Due  MEDICARE YEARLY EXAM  11/08/2017  BREAST CANCER SCRN MAMMOGRAM  12/12/2017 Albumin Urine Test: About This Test 
What is it? An albumin urine test checks urine for a protein called albumin. This protein is normally found in the blood. When the kidneys are damaged, small amounts of albumin leak into the urine. This is called albuminuria. If the amount of albumin is very small, but still abnormal, it is called microalbuminuria. You might provide a urine sample for your doctor during a visit. Your doctor might also ask you for a one-time sample at home or over a specific period of time, such as over 4 hours or 24 hours. Your doctor will tell you what to do. Why is this test done? This test is done to check for albumin in the urine. It helps tell your doctor how well your kidneys are working. This test is done most often to check the kidneys in people with diabetes. Other conditions also cause albuminuria. These conditions include high blood pressure, heart failure, and cirrhosis. The sooner your doctor knows you have kidney damage, the more your doctor can do to protect your kidneys. How can you prepare for the test? 
· Do not exercise just before the test. 
· Tell your doctor if you are having your period or have vaginal discharge. · Tell your doctor about all the nonprescription and prescription medicines and herbs or other supplements you take.  Some of these can affect the results of this test. 
What happens before the test? 
· Your doctor or the lab likely will give you the container you need to hold the urine. You will get instructions on when and how to collect the urine. This might be a one-time sample or a number of samples over a period of time. What happens during the test? 
One-time urine collection · Wash your hands before you start. · If the collection cup you are given has a lid, remove it carefully. Set it down with the inner surface up. Do not touch the inside of the cup with your fingers. · Clean the area around your genitals. ¨ For men: Pull back the foreskin, if present, and clean the head of the penis with medicated towelettes or swabs. ¨ For women: Spread open the genital folds of skin with one hand. Then use medicated towelettes or swabs in your other hand to clean the area where urine comes out (the urethra). Wipe the area from front to back. · Start urinating into the toilet or urinal. A woman should hold apart the genital folds of skin while she urinates. · After the urine has flowed for several seconds, place the cup into the urine stream. Collect about 2 fluid ounces of this \"midstream\" urine without stopping your flow of urine. · Don't touch the rim of the cup to your genital area. Don't get toilet paper, pubic hair, stool (feces), menstrual blood, or anything else in the urine sample. · Finish urinating into the toilet or urinal. 
· Carefully replace and tighten the lid on the cup, and then return it to the lab. If you are collecting the urine at home and can't get it to the lab in an hour, refrigerate it. Urine collection over time You collect your urine for a period of time, such as over 4 or 24 hours. · You start collecting your urine in the morning. When you first get up, empty your bladder. But do not save this urine. Write down the time that you began. · For the set period of time, collect all your urine. Urinate into a small, clean container. Then pour the urine into the large container. Don't touch the inside of the container with your fingers. · Keep the collected urine in the refrigerator for the collection time. Empty your bladder for the last time at or just before the end of the collection period. Add this urine to the large container. Then write down the time. What else should you know about the test? 
· If your results are higher than normal, your doctor may check your urine more often to watch for kidney damage. · If your test shows that you may have kidney damage, you may get other tests. What happens after the test? 
· Follow your doctor's instructions for taking the urine to the doctor's office or lab. · You can go back to your usual activities right away. When should you call for help? Watch closely for changes in your health, and be sure to contact your doctor if you have any problems. Follow-up care is a key part of your treatment and safety. Be sure to make and go to all appointments, and call your doctor if you are having problems. It's also a good idea to keep a list of the medicines you take. Ask your doctor when you can expect to have your test results. Where can you learn more? Go to http://pratik-niki.info/. Enter L729 in the search box to learn more about \"Albumin Urine Test: About This Test.\" Current as of: May 12, 2017 Content Version: 11.4 © 0661-1100 Healthwise, Incorporated. Care instructions adapted under license by [employer] (which disclaims liability or warranty for this information). If you have questions about a medical condition or this instruction, always ask your healthcare professional. [place] [address] any warranty or liability for your use of this information. Introducing [facility] HE[facility]! Rebecca Liu introduces [employer] patient portal. Now you can access parts of your medical record, email your doctor's office, and request medication refills online. 1. In your internet browser, go to [email or web address] 2. Click on the First Time User? Click Here link in the Sign In box. You will see the New Member Sign Up page. 3. Enter your [employer] Access Code exactly as it appears below. You will not need to use this code after youve completed the sign-up process. If you do not sign up before the expiration date, you must request a new code. · [employer] Access Code: [email or web address] Expires: 1/30/2018  7:17 AM 
 
4. Enter the last four digits of your Social Security Number (xxxx) and Date of Birth (mm/dd/yyyy) as indicated and click Submit. You will be taken to the next sign-up page. 5. Create a [employer] ID. This will be your [employer] login ID and cannot be changed, so think of one that is secure and easy to remember. 6. Create a [employer] password. You can change your password at any time. 7. Enter your Password Reset Question and Answer. This can be used at a later time if you forget your password. 8. Enter your e-mail address. You will receive e-mail notification when new information is available in [address] 9. Click Sign Up. You can now view and download portions of your medical record. 10. Click the Download Summary menu link to download a portable copy of your medical information. If you have questions, please visit the Frequently Asked Questions section of the [employer] website. Remember, [employer] is NOT to be used for urgent needs. For medical emergencies, dial 911. Now available from your iPhone and Android! Please provide this summary of care documentation to your next provider. Your primary care clinician is listed as Carlos Valdez. If you have any questions after today's visit, please call [phone].

## 2017-11-17 NOTE — PROGRESS NOTES
INTERNISTS [facility]:  11/17/2017, MRN: [id number]      Kirill Smart is a 67 y.o. female and presents to clinic for Abnormal Lab Results (done by Rheumatologist Dr Parkinson Home informed patient this week that her Urine Lab came back with Protein Abnormal and needs to see Nephrologist or PCP to discuss)    Subjective:   Pt is a 65yo female with h/o sjogren's syndrome per EHR, hydronephrosis, autoimmune hepatitis, osteoporosis, hematuria, and atrial flutter. Proteinuria: The patient reports no changes in her urinary patterns. No hematuria although she has a history of hematuria when on anticoagulation and she has a history of microscopic hematuria. The evaluation of bladder symptoms by the urology team did not show any significant abnormalities. Meanwhile, the patient reports no urinary frequency symptoms. No dysuria. No fever or chills. She has hypertension and atrial flutter. She is on metoprolol. Her blood pressure is well controlled. At her last rheumatology appointment just 2 days ago, the patient was told that she has worsening proteinuria. She was instructed to follow-up with me and discussed the potential referral to a nephrologist for additional testing. She has known Sjogren's disease and is not on any medication. She has dry eyes and mouth. No joint pain. No melena or hematochezia.   She also has autoimmune hepatitis and is followed by the gastroenterology team.      Patient Active Problem List    Diagnosis Date Noted    Osteoporosis 07/19/2017    Sjogren's syndrome ([email or web address] 10/05/2016    Autoimmune hepatitis ([email or web address] 07/13/2015    History of hematuria 11/21/2014    Atrial flutter ([email or web address] 10/28/2014       Current Outpatient Prescriptions   Medication Sig Dispense Refill    metoprolol succinate (TOPROL-XL) 25 mg XL tablet TAKE 1 TABLET BY MOUTH TWICE DAILY 60 Tab 3    metoprolol tartrate (LOPRESSOR) 25 mg tablet  1 tablet daily as needed 30 Tab 5    cyclobenzaprine (FLEXERIL) 10 mg tablet Take 10 mg by mouth daily as needed. 1 tab at bedtime  Indications: FIBROMYALGIA      calcium 500 mg Tab Take 500 mg by mouth. BID      multivitamin (ONE A DAY) tablet Take 1 Tab by mouth daily.  cycloSPORINE (RESTASIS) 0.05 % ophthalmic emulsion Administer 1 drop to both eyes daily.  FLAXSEED OIL (OMEGA 3 PO) Take  by mouth daily as needed. Allergies   Allergen Reactions    Adhesive Hives    Mercurochrome [Merbromin] Rash    Merthiolate (Thimerosal) Rash    Nasacort [Triamcinolone Acetonide] Other (comments)     congestion    Pcn [Penicillins] Rash and Other (comments)    Azatadine Maleate Other (comments)    Neomycin-Bacitracin-Polymyxin Other (comments)       Past Medical History:   Diagnosis Date    Abdominal pain     Autoimmune hepatitis ([facility] [address] 4-24-15    Dr Charmaine Merlos, s/p liver biopsy    Basal cell cancer     BPPV (benign paroxysmal positional vertigo)     Dr Veróinca Koenig Cardiac echocardiogram 11/11/2014    EF 50-55%. No WMA. Gr 1 DDfx. RVSP 26 mmHg.       Chronic lung disease     Degenerative disc disease, lumbar     Enlarged lymph nodes     celiac artery-follow up CT showed improvement in MAY 2015    Fracture     GERD (gastroesophageal reflux disease)     HCV (hepatitis C virus)     Hematuria     gross with xarelto-Dr Minh Patino, Dr Xenia Kim: Oncology-Hematology-[facility]    Hemorrhage 1962 and 1963    Hepatitis C     treated    Hepatitis C 2002    Liver disease     Lumbar spondylosis with myelopathy     Ocular migraine     Osteoarthritis     Osteoporosis     Otosclerosis     Bilateral    Pinched nerve [id number]    C5 and C6    Scoliosis     Sjogren's syndrome ([address] 1999    SVT (supraventricular tachycardia) ([address] 10-28-14    Tachycardia     Transient global amnesia 9/4/07/, 4/12/12    x2    Wedging of vertebra ([address]     Dry needling completed       Past Surgical History:   Procedure Laterality Date    HX ADENOIDECTOMY      HX APPENDECTOMY      HX COLONOSCOPY  9/2013    HX CYST INCISION AND DRAINAGE Bilateral 1973    HX TONSILLECTOMY         Family History   Problem Relation Age of Onset   Dave Gutierrez Cancer Mother 76     stomach    Stroke Maternal Grandmother     Heart Disease Brother     Cancer Brother      Lyphoma    Stroke Father     Arthritis-osteo Sister     Osteoporosis Sister     No Known Problems Maternal Grandfather     Osteoporosis Paternal Grandmother     Osteoporosis Paternal Grandfather     Diabetes Brother        Social History   Substance Use Topics    Smoking status: Former Smoker     Packs/day: 1.50     Years: 10.00     Types: Cigarettes     Quit date: 9/18/1973    Smokeless tobacco: Never Used    Alcohol use No       ROS   Review of Systems   Constitutional: Negative for chills and fever. HENT: Negative for ear pain and sore throat. Eyes: Negative for blurred vision and pain. Respiratory: Negative for shortness of breath. Cardiovascular: Negative for chest pain. Gastrointestinal: Negative for abdominal pain, blood in stool and melena. Genitourinary: Negative for dysuria, frequency and hematuria. Musculoskeletal: Negative for joint pain and myalgias. Skin: Negative for rash. Neurological: Negative for tingling, focal weakness and headaches. Endo/Heme/Allergies: Does not bruise/bleed easily. Psychiatric/Behavioral: Negative for substance abuse. Objective     Vitals:    11/17/17 1010   BP: 135/68   Pulse: 61   Resp: 12   Temp: 96.9 °F (36.1 °C)   TempSrc: Oral   SpO2: 96%   Weight: 119 lb 6.4 oz (54.2 kg)   Height: 5' 3\" (1.6 m)   PainSc:   0 - No pain       Physical Exam   Constitutional: She is oriented to person, place, and time and well-developed, well-nourished, and in no distress. HENT:   Head: Normocephalic and atraumatic. Right Ear: External ear normal.   Left Ear: External ear normal.   Nose: Nose normal.   Mouth/Throat: Oropharynx is clear and moist. No oropharyngeal exudate.    Clear TMs   Eyes: Conjunctivae and EOM are normal. Pupils are equal, round, and reactive to light. Right eye exhibits no discharge. Left eye exhibits no discharge. No scleral icterus. Neck: Neck supple. Cardiovascular: Normal rate, regular rhythm, normal heart sounds and intact distal pulses. Exam reveals no gallop and no friction rub. No murmur heard. Pulmonary/Chest: Effort normal and breath sounds normal. No respiratory distress. She has no wheezes. She has no rales. Abdominal: Soft. Bowel sounds are normal. She exhibits no distension. There is no tenderness. There is no rebound and no guarding. No hepatomegaly   Musculoskeletal: She exhibits no edema or tenderness (BUE are NTTP). Lymphadenopathy:     She has no cervical adenopathy. Neurological: She is alert and oriented to person, place, and time. She exhibits normal muscle tone. Gait normal.   Skin: Skin is warm and dry. No erythema. Psychiatric: Affect normal.   Nursing note and vitals reviewed.       LABS   Data Review:   Lab Results   Component Value Date/Time    WBC 6.1 01/13/2017 11:47 AM    HGB 13.0 01/13/2017 11:47 AM    HCT 39.3 01/13/2017 11:47 AM    PLATELET 997 03/72/6149 11:47 AM    MCV 91.4 01/13/2017 11:47 AM       Lab Results   Component Value Date/Time    Sodium 142 01/13/2017 11:47 AM    Potassium 4.3 01/13/2017 11:47 AM    Chloride 105 01/13/2017 11:47 AM    CO2 28 01/13/2017 11:47 AM    Anion gap 9 01/13/2017 11:47 AM    Glucose 88 01/13/2017 11:47 AM    BUN 12 01/13/2017 11:47 AM    Creatinine 0.76 01/13/2017 11:47 AM    BUN/Creatinine ratio 16 01/13/2017 11:47 AM    GFR est AA >60 01/13/2017 11:47 AM    GFR est non-AA >60 01/13/2017 11:47 AM    Calcium 9.3 01/13/2017 11:47 AM       Lab Results   Component Value Date/Time    Cholesterol, total 185 11/30/2016 08:30 AM    HDL Cholesterol 74 11/30/2016 08:30 AM    LDL, calculated 92.6 11/30/2016 08:30 AM    VLDL, calculated 18.4 11/30/2016 08:30 AM    Triglyceride 92 11/30/2016 08:30 AM CHOL/HDL Ratio 2.5 11/30/2016 08:30 AM       Assessment/Plan:   Microalbuminuria and H/o Sjogren's disease and Autoimmune Hepatitis: +H/o microscopic hematuria and a gross hematuria episode when on AC. Urology evaluation was unremarkable at those times. +Asymptomatic.   -Placing a referral to the nephrology team.  -Checking a urine protein 24-hour collection, CMP, RNP Ab, CBC, hepatitis C serology, hepatitis B surface antigen, anti-double-stranded DNA antibody, UA, citrullinated Ab, ARTHUR, and CRP. -We will also check a kidney ultrasound for completeness. ORDERS:  - REFERRAL TO NEPHROLOGY  - PROTEIN, URINE, 24 HR; Future  - US RETROPERITONEUM COMP; Future  - METABOLIC PANEL, COMPREHENSIVE; Future  - CBC WITH AUTOMATED DIFF; Future  - HCV AB W/RFLX TO SUNDEEP; Future  - HEP B SURFACE AG; Future  - DNA AB, DOUBLE STRANDED, IGG; Future  - URINALYSIS W/ RFLX MICROSCOPIC; Future  - C REACTIVE PROTEIN, QT; Future  - RNP ANTIBODIES; Future  - ARTHUR COMPREHENSIVE PANEL; Future  - CYCLIC CITRUL PEPTIDE AB, IGG; Future      Health Maintenance Due   Topic Date Due    BREAST CANCER SCRN MAMMOGRAM  12/12/2017     Lab review: labs are reviewed in the EHR    I have discussed the diagnosis with the patient and the intended plan as seen in the above orders. The patient has received an after-visit summary and questions were answered concerning future plans. I have discussed medication side effects and warnings with the patient as well. I have reviewed the plan of care with the patient, accepted their input and they are in agreement with the treatment goals. All questions were answered. The patient understands the plan of care. Handouts provided today with above information. Pt instructed if symptoms worsen to call the office or report to the ED for continued care. Greater than 50% of the visit time was spent in counseling and/or coordination of care.         Follow-up Disposition:  Return in about 8 weeks (around 1/12/2018) for lab results.     Neftali Gabriel MD

## 2017-11-17 NOTE — PROGRESS NOTES
Chief Complaint   Patient presents with    Abnormal Lab Results     done by Rheumatologist Dr Amye Hodgkins informed patient this week that her Urine Lab came back with Protein Abnormal and needs to see Nephrologist or PCP to discuss     1. Have you been to the ER, urgent care clinic since your last visit? Hospitalized since your last visit? No    2. Have you seen or consulted any other health care providers outside of the [address] [place] [address] since your last visit? Include any pap smears or colon screening.  No

## 2017-11-20 PROBLEM — M35.1 MIXED CONNECTIVE TISSUE DISEASE (HCC): Status: ACTIVE | Noted: 2017-01-01

## 2017-11-20 NOTE — PROGRESS NOTES
Please let the pt know that her preliminary results indicate that she has mixed connective tissue disease - not just Sjogren's or Rheumatoid arthritis. She can manifest symptoms from various rheumatologic conditions with \"mixed connective tissue disease\" - which is suggested by her positive RNP antibody labs. Her labs do not show any evidence that these conditions are causing renal problems as her blood kidney test, her creatinine, was within normal limits. There is no evidence of permanent kidney damage at this time. I will wait to see the results of her 24 hour urine protein screen to determine what needs to be done next.      Dr. Evie Gray  [facility], [address] [place] [address].  Phone: [phone]  Fax: [phone]

## 2017-11-22 NOTE — TELEPHONE ENCOUNTER
Patient returning call says she will be a little bust today so if she doesn't answer can just leave a name for her to call back

## 2017-11-22 NOTE — TELEPHONE ENCOUNTER
Patient was reached and informed of the below information per Dr Nicole Fothergill. The patient also understands we will call her when Dr Nicole Fothergill returns to the office on 11-27-17 regarding the Urine Protein Screen Results that were resulted on 11-20-17, she understands at this time no result notes have been given to us by Dr Nicole Fothergill for that [address] Screening. All understood. Please let the pt know that her preliminary results indicate that she has mixed connective tissue disease - not just Sjogren's or Rheumatoid arthritis. She can manifest symptoms from various rheumatologic conditions with \"mixed connective tissue disease\" - which is suggested by her positive RNP antibody labs. Her labs do not show any evidence that these conditions are causing renal problems as her blood kidney test, her creatinine, was within normal limits. There is no evidence of permanent kidney damage at this time.   I will wait to see the results of her 24 hour urine protein screen to determine what needs to be done next

## 2017-12-04 PROBLEM — R76.8 POSITIVE HEPATITIS C ANTIBODY TEST: Status: ACTIVE | Noted: 2017-01-01

## 2017-12-05 NOTE — MR AVS SNAPSHOT
Visit Information Date & Time Provider Department Dept. Phone Encounter #  
 12/5/2017  3:30 PM Wyatt Farias MD [facility]  Your Appointments 1/12/2018  8:30 AM  
Office Visit with Wyatt Farias MD  
[facility]) Appt Note: 2 month f/u  
 [address] [facility], [address] [place]  
  
   
 [address], [address]  
  
    
 1/19/2018  8:00 AM  
Office Visit with Wyatt Farias MD  
[facility]) Appt Note: 6 month f/u  
 [address] [facility], [address] [facility]  
[phone]  
  
    
 5/4/2018  8:40 AM  
Follow Up with Shellie Spivey MD  
[facility] [place] [facility]\"" ([facility]) Appt Note: 6 month follow up [place] [id number] [phone] [facility] [address] Upcoming Health Maintenance Date Due  
 BREAST CANCER SCRN MAMMOGRAM 12/12/2017 COLONOSCOPY 9/24/2018 MEDICARE YEARLY EXAM 11/18/2018 GLAUCOMA SCREENING Q2Y 2/23/2019 DTaP/Tdap/Td series (2 - Td) 10/5/2026 Allergies as of 12/5/2017  Review Complete On: 12/5/2017 By: Melisa Garza Severity Noted Reaction Type Reactions Adhesive High 09/18/2013    Hives Mercurochrome [Merbromin] High 09/18/2013    Rash Merthiolate (Thimerosal) High 09/18/2013    Rash Nasacort [Triamcinolone Acetonide] High 09/18/2013    Other (comments)  
 congestion Pcn [Penicillins] Medium 06/01/2011    Rash, Other (comments) Azatadine Maleate  06/21/2011    Other (comments) Neomycin-bacitracin-polymyxin  06/21/2011    Other (comments) Current Immunizations  Reviewed on 8/24/2017 No immunizations on file. Not reviewed this visit You Were Diagnosed With   
  
 Codes Comments Sore throat    -  Primary ICD-10-CM: J02.9 ICD-9-CM: 097 Nose congested     ICD-10-CM: R09.81 ICD-9-CM: 478.19 Malaise and fatigue     ICD-10-CM: R53.81, R53.83 ICD-9-CM: 780.79 Acute bronchitis, unspecified organism     ICD-10-CM: J20.9 ICD-9-CM: 466.0 Mixed connective tissue disease ([facility] [address]     ICD-10-CM: M35.1 ICD-9-CM: 710.8 Vitals BP Pulse Temp Resp Height(growth percentile) Weight(growth percentile) 124/56 (BP 1 Location: Left arm, BP Patient Position: Sitting) 64 98.1 °F (36.7 °C) (Oral) 12 5' 3\" (1.6 m) 119 lb 9.6 oz (54.3 kg) SpO2 BMI OB Status Smoking Status 100% 21.19 kg/m2 Postmenopausal Former Smoker Vitals History BMI and BSA Data Body Mass Index Body Surface Area  
 21.19 kg/m 2 1.55 m 2 Preferred Pharmacy Pharmacy Name Phone [address] [place] [address] 22 [address] [facility] [phone] Your Updated Medication List  
  
   
This list is accurate as of: 12/5/17  4:02 PM.  Always use your most recent med list.  
  
  
  
  
 azithromycin 250 mg tablet Commonly known as:  Med[place] Congress Take 500mg (2 tabs) on Day 1 and then 250mg (1 tab) daily on Days 2-5.  
  
 calcium 500 mg Tab Take 500 mg by mouth. BID FLEXERIL 10 mg tablet Generic drug:  cyclobenzaprine Take 10 mg by mouth daily as needed. 1 tab at bedtime  Indications: FIBROMYALGIA  
  
 metoprolol succinate 25 mg XL tablet Commonly known as:  TOPROL-XL  
TAKE 1 TABLET BY MOUTH TWICE DAILY  
  
 metoprolol tartrate 25 mg tablet Commonly known as:  LOPRESSOR  
1 tablet daily as needed  
  
 multivitamin tablet Commonly known as:  ONE A DAY Take 1 Tab by mouth daily. OMEGA 3 PO Take  by mouth daily as needed. RESTASIS 0.05 % ophthalmic emulsion Generic drug:  cycloSPORINE Administer 1 drop to both eyes daily. VIRTUSSIN -10 mg/5 mL solution Generic drug:  guaiFENesin-codeine Take 5 mL by mouth three (3) times daily as needed for Cough. Prescriptions Sent to Pharmacy Refills  
 azithromycin (ZITHROMAX) 250 mg tablet 0 Sig: Take 500mg (2 tabs) on Day 1 and then 250mg (1 tab) daily on Days 2-5. Class: Normal  
 Pharmacy: [address] [place] [address] #: [phone] We Performed the Following AMB POC RAPID STREP A [37237 CPT(R)] To-Do List   
 12/13/2017 4:30 PM  
  Appointment with [facility] 2 at [address] [place] [address] ([phone]) OUTSIDE FILMS  - Any outside films related to the study being scheduled should be brought with you on the day of the exam.  If this cannot be done there may be a delay in the reading of the study. MEDICATIONS  - Patient must bring a complete list of all medications currently taking to include prescriptions, over-the-counter meds, herbals, vitamins & any dietary supplements  GENERAL INSTRUCTIONS  - On the day of your exam do not use any bath powder, deodorant or lotions on the armpit area. -Tenderness of breasts may cause an increase of discomfort during procedure. If you are experiencing breast tenderness on the day of your appointment and would like to reschedule, please call [phone]. Patient Instructions Health Maintenance Due Topic Date Due  
 BREAST CANCER SCRN MAMMOGRAM  12/12/2017 11/18/2017  1:36 PM - [facility]"  
   
Component Results   
  Component Value Flag Ref Range Units Status  
  Anti-DNA (DS) Ab, QT <1  0 - 9 IU/mL Final  
  Comment:  
  (NOTE)                                   Negative      <5  
                                  Equivocal  5 - 9  
                                  Positive      >9  
  
  RNP Abs 1.6 (H) 0.0 - 0.9 AI Final  
  Rolle Abs <0.2  0.0 - 0.9 AI Final  
  Scleroderma-70 Ab <0.2  0.0 - 0.9 AI Final  
  Sjogren's Anti-SS-A 7.8 (H) 0.0 - 0.9 AI Final  
   Sjogren's Anti-SS-B <0.2  0.0 - 0.9 AI Final  
  Antichromatin Ab <0.2  0.0 - 0.9 AI Final  
  Anti-Kimberly-1 <0.2  0.0 - 0.9 AI Final  
  Centromere B Ab <0.2  0.0 - 0.9 AI Final  
  See below Comment     Final  
  Comment:  
  (NOTE) Autoantibody                       Disease Association  
------------------------------------------------------------  
                       Condition                  Frequency  
---------------------   ------------------------   --------- Antinuclear Antibody,    SLE, mixed connective Direct (ARTHUR-D)           tissue diseases  
---------------------   ------------------------   ---------  
dsDNA                    SLE                        40 - 60%  
---------------------   ------------------------   --------- Chromatin                Drug induced SLE                90%                         SLE                        48 - 97%  
---------------------   ------------------------   ---------  
SSA (Ro)                 SLE                        25 - 35%  
                        Sjogren's Syndrome         40 - 70%                          Lupus                 100% ---------------------   ------------------------   ---------  
SSB (La)                 SLE                             10%                         Sjogren's Syndrome              30%  
---------------------   -----------------------    --------- Sm (anti-Smith)          SLE                        15 - 30%  
---------------------   -----------------------    ---------  
RNP                      Mixed Connective Tissue  
                        Disease                         95%  
(U1 nRNP,                SLE                        30 - 50%  
anti-ribonucleoprotein)  Polymyositis and/or                         Dermatomyositis                 20%  
---------------------   ------------------------   --------- Scl-70 (antiDNA          Scleroderma (diffuse)      20 - 35% topoisomerase)           Crest                           13%  
---------------------   ------------------------   ---------  
Kimberly-1                     Polymyositis and/or                         Dermatomyositis            20 - 40%  
---------------------   ------------------------   --------- Centromere B             Scleroderma - Crest  
                        variant                         80% Performed At: [facility] [address] [place], [place] [id number] Armaan Boateng MD B Lab Results Component Value Date/Time WBC 4.6 2017 10:45 AM  
 HGB 13.1 2017 10:45 AM  
 HCT 40.6 2017 10:45 AM  
 PLATELET 135  10:45 AM  
 MCV 92.7 2017 10:45 AM  
 
Lab Results Component Value Date/Time Sodium 140 2017 10:45 AM  
 Potassium 4.0 2017 10:45 AM  
 Chloride 104 2017 10:45 AM  
 CO2 28 2017 10:45 AM  
 Anion gap 8 2017 10:45 AM  
 Glucose 86 2017 10:45 AM  
 BUN 12 2017 10:45 AM  
 Creatinine 0.73 2017 10:45 AM  
 BUN/Creatinine ratio 16 2017 10:45 AM  
 GFR est AA >60 2017 10:45 AM  
 GFR est non-AA >60 2017 10:45 AM  
 Calcium 9.5 2017 10:45 AM  
 Bilirubin, total 0.5 2017 10:45 AM  
 AST (SGOT) 37 2017 10:45 AM  
 Alk. phosphatase 60 2017 10:45 AM  
 Protein, total 8.0 2017 10:45 AM  
 Albumin 4.3 2017 10:45 AM  
 Globulin 3.7 2017 10:45 AM  
 A-G Ratio 1.2 2017 10:45 AM  
 ALT (SGPT) 39 2017 10:45 AM  
 
 
  
Bronchitis: Care Instructions Your Care Instructions Bronchitis is inflammation of the bronchial tubes, which carry air to the lungs. The tubes swell and produce mucus, or phlegm. The mucus and inflamed bronchial tubes make you cough. You may have trouble breathing. Most cases of bronchitis are caused by viruses like those that cause colds. Antibiotics usually do not help and they may be harmful. Bronchitis usually develops rapidly and lasts about 2 to 3 weeks in otherwise healthy people. Follow-up care is a key part of your treatment and safety. Be sure to make and go to all appointments, and call your doctor if you are having problems. It's also a good idea to know your test results and keep a list of the medicines you take. How can you care for yourself at home? · Take all medicines exactly as prescribed. Call your doctor if you think you are having a problem with your medicine. · Get some extra rest. 
· Take an over-the-counter pain medicine, such as acetaminophen (Tylenol), ibuprofen (Advil, Motrin), or naproxen (Aleve) to reduce fever and relieve body aches. Read and follow all instructions on the label. · Do not take two or more pain medicines at the same time unless the doctor told you to. Many pain medicines have acetaminophen, which is Tylenol. Too much acetaminophen (Tylenol) can be harmful. · Take an over-the-counter cough medicine that contains dextromethorphan to help quiet a dry, hacking cough so that you can sleep. Avoid cough medicines that have more than one active ingredient. Read and follow all instructions on the label. · Breathe moist air from a humidifier, hot shower, or sink filled with hot water. The heat and moisture will thin mucus so you can cough it out. · Do not smoke. Smoking can make bronchitis worse. If you need help quitting, talk to your doctor about stop-smoking programs and medicines. These can increase your chances of quitting for good. When should you call for help? Call 911 anytime you think you may need emergency care. For example, call if: 
? · You have severe trouble breathing. ?Call your doctor now or seek immediate medical care if: 
? · You have new or worse trouble breathing. ? · You cough up dark brown or bloody mucus (sputum). ? · You have a new or higher fever. ? · You have a new rash. ?Watch closely for changes in your health, and be sure to contact your doctor if: 
? · You cough more deeply or more often, especially if you notice more mucus or a change in the color of your mucus. ? · You are not getting better as expected. Where can you learn more? Go to http://pratik-niki.info/. Enter H333 in the search box to learn more about \"Bronchitis: Care Instructions. \" Current as of: May 12, 2017 Content Version: 11.4 © 1655-3064 [employer]. Care instructions adapted under license by [employer] (which disclaims liability or warranty for this information). If you have questions about a medical condition or this instruction, always ask your healthcare professional. [address] any warranty or liability for your use of this information. Introducing [facility]! Amy Small introduces Thanx patient portal. Now you can access parts of your medical record, email your doctor's office, and request medication refills online. 1. In your internet browser, go to [email or web address] 2. Click on the First Time User? Click Here link in the Sign In box. You will see the New Member Sign Up page. 3. Enter your Thanx Access Code exactly as it appears below. You will not need to use this code after youve completed the sign-up process. If you do not sign up before the expiration date, you must request a new code. · Thanx Access Code: [email or web address] Expires: 1/30/2018  7:17 AM 
 
4. Enter the last four digits of your Social Security Number (xxxx) and Date of Birth (mm/dd/yyyy) as indicated and click Submit. You will be taken to the next sign-up page. 5. Create a Thanx ID. This will be your Thanx login ID and cannot be changed, so think of one that is secure and easy to remember. 6. Create a [facility] password. You can change your password at any time. 7. Enter your Password Reset Question and Answer. This can be used at a later time if you forget your password. 8. Enter your e-mail address. You will receive e-mail notification when new information is available in [address] 9. Click Sign Up. You can now view and download portions of your medical record. 10. Click the Download Summary menu link to download a portable copy of your medical information. If you have questions, please visit the Frequently Asked Questions section of the [employer] website. Remember, [employer] is NOT to be used for urgent needs. For medical emergencies, dial 911. Now available from your iPhone and Android! Please provide this summary of care documentation to your next provider. Your primary care clinician is listed as Waldo Ware. If you have any questions after today's visit, please call [phone].

## 2017-12-05 NOTE — PATIENT INSTRUCTIONS
Health Maintenance Due   Topic Date Due    BREAST CANCER SCRN MAMMOGRAM  2017  1:36 PM - [facility]       Component Results      Component Value Flag Ref Range Units Status     Anti-DNA (DS) Ab, QT <1  0 - 9 IU/mL Final     Comment:     (NOTE)                                     Negative      <5                                     Equivocal  5 - 9                                     Positive      >9        RNP Abs 1.6 (H) 0.0 - 0.9 AI Final     Rolle Abs <0.2  0.0 - 0.9 AI Final     Scleroderma-70 Ab <0.2  0.0 - 0.9 AI Final     Sjogren's Anti-SS-A 7.8 (H) 0.0 - 0.9 AI Final     Sjogren's Anti-SS-B <0.2  0.0 - 0.9 AI Final     Antichromatin Ab <0.2  0.0 - 0.9 AI Final     Anti-Kimberly-1 <0.2  0.0 - 0.9 AI Final     Centromere B Ab <0.2  0.0 - 0.9 AI Final     See below Comment     Final     Comment:     (NOTE)   Autoantibody                       Disease Association   ------------------------------------------------------------                          Condition                  Frequency   ---------------------   ------------------------   ---------   Antinuclear Antibody,    SLE, mixed connective   Direct (ARTHUR-D)           tissue diseases   ---------------------   ------------------------   ---------   dsDNA                    SLE                        40 - 60%   ---------------------   ------------------------   ---------   Chromatin                Drug induced SLE                90%                           SLE                        48 - 97%   ---------------------   ------------------------   ---------   SSA (Ro)                 SLE                        25 - 35%                           Sjogren's Syndrome         40 - 70%                            Lupus                 100%   ---------------------   ------------------------   ---------   SSB (La)                 SLE                             10%                           Sjogren's Syndrome              30% ---------------------   -----------------------    ---------   Sm (anti-Smith)          SLE                        15 - 30%   ---------------------   -----------------------    ---------   RNP                      Mixed Connective Tissue                           Disease                         95%   (U1 nRNP,                SLE                        30 - 50%   anti-ribonucleoprotein)  Polymyositis and/or                           Dermatomyositis                 20%   ---------------------   ------------------------   ---------   Scl-70 (antiDNA          Scleroderma (diffuse)      20 - 35%   topoisomerase)           Crest                           13%   ---------------------   ------------------------   ---------   Kimberly-1                     Polymyositis and/or                           Dermatomyositis            20 - 40%   ---------------------   ------------------------   ---------   Centromere B             Scleroderma - Crest                           variant                         80%   Performed At: [facility]   [address] [id number]   Diana Barboza MD PX:[id number]        Lab Results   Component Value Date/Time    WBC 4.6 11/17/2017 10:45 AM    HGB 13.1 11/17/2017 10:45 AM    HCT 40.6 11/17/2017 10:45 AM    PLATELET 971 92/30/4276 10:45 AM    MCV 92.7 11/17/2017 10:45 AM     Lab Results   Component Value Date/Time    Sodium 140 11/17/2017 10:45 AM    Potassium 4.0 11/17/2017 10:45 AM    Chloride 104 11/17/2017 10:45 AM    CO2 28 11/17/2017 10:45 AM    Anion gap 8 11/17/2017 10:45 AM    Glucose 86 11/17/2017 10:45 AM    BUN 12 11/17/2017 10:45 AM    Creatinine 0.73 11/17/2017 10:45 AM    BUN/Creatinine ratio 16 11/17/2017 10:45 AM    GFR est AA >60 11/17/2017 10:45 AM    GFR est non-AA >60 11/17/2017 10:45 AM    Calcium 9.5 11/17/2017 10:45 AM    Bilirubin, total 0.5 11/17/2017 10:45 AM    AST (SGOT) 37 11/17/2017 10:45 AM    Alk.  phosphatase 60 11/17/2017 10:45 AM Protein, total 8.0 11/17/2017 10:45 AM    Albumin 4.3 11/17/2017 10:45 AM    Globulin 3.7 11/17/2017 10:45 AM    A-G Ratio 1.2 11/17/2017 10:45 AM    ALT (SGPT) 39 11/17/2017 10:45 AM          Bronchitis: Care Instructions  Your Care Instructions    Bronchitis is inflammation of the bronchial tubes, which carry air to the lungs. The tubes swell and produce mucus, or phlegm. The mucus and inflamed bronchial tubes make you cough. You may have trouble breathing. Most cases of bronchitis are caused by viruses like those that cause colds. Antibiotics usually do not help and they may be harmful. Bronchitis usually develops rapidly and lasts about 2 to 3 weeks in otherwise healthy people. Follow-up care is a key part of your treatment and safety. Be sure to make and go to all appointments, and call your doctor if you are having problems. It's also a good idea to know your test results and keep a list of the medicines you take. How can you care for yourself at home? · Take all medicines exactly as prescribed. Call your doctor if you think you are having a problem with your medicine. · Get some extra rest.  · Take an over-the-counter pain medicine, such as acetaminophen (Tylenol), ibuprofen (Advil, Motrin), or naproxen (Aleve) to reduce fever and relieve body aches. Read and follow all instructions on the label. · Do not take two or more pain medicines at the same time unless the doctor told you to. Many pain medicines have acetaminophen, which is Tylenol. Too much acetaminophen (Tylenol) can be harmful. · Take an over-the-counter cough medicine that contains dextromethorphan to help quiet a dry, hacking cough so that you can sleep. Avoid cough medicines that have more than one active ingredient. Read and follow all instructions on the label. · Breathe moist air from a humidifier, hot shower, or sink filled with hot water. The heat and moisture will thin mucus so you can cough it out. · Do not smoke.  Smoking can make bronchitis worse. If you need help quitting, talk to your doctor about stop-smoking programs and medicines. These can increase your chances of quitting for good. When should you call for help? Call 911 anytime you think you may need emergency care. For example, call if:  ? · You have severe trouble breathing. ?Call your doctor now or seek immediate medical care if:  ? · You have new or worse trouble breathing. ? · You cough up dark brown or bloody mucus (sputum). ? · You have a new or higher fever. ? · You have a new rash. ? Watch closely for changes in your health, and be sure to contact your doctor if:  ? · You cough more deeply or more often, especially if you notice more mucus or a change in the color of your mucus. ? · You are not getting better as expected. Where can you learn more? Go to http://pratik-niki.info/. Enter H333 in the search box to learn more about \"Bronchitis: Care Instructions. \"  Current as of: May 12, 2017  Content Version: 11.4  © 9091-5539 [employer]wise[employer]. Care instructions adapted under license by [employer] (which disclaims liability or warranty for this information). If you have questions about a medical condition or this instruction, always ask your healthcare professional. [address] any warranty or liability for your use of this information.

## 2017-12-05 NOTE — PROGRESS NOTES
Chief Complaint   Patient presents with    Sore Throat     x 2 weeks    Hoarse    Cough     productive clear-yellow    Nasal Discharge     dark green-bloody    Malaise     1. Have you been to the ER, urgent care clinic since your last visit? Hospitalized since your last visit? No    2. Have you seen or consulted any other health care providers outside of the [address] [place] [address] since your last visit? Include any pap smears or colon screening.  No

## 2017-12-05 NOTE — PROGRESS NOTES
INTERNISTS OF [facility]:  12/5/2017, MRN: [id number]      Nandini Barrios is a 67 y.o. female and presents to clinic for Sore Throat (x 2 weeks); Hoarse; Cough (productive clear-yellow); Nasal Discharge (dark green-bloody); and Malaise    Subjective:   Pt is a 65yo female with h/o sjogren's syndrome per EHR, autoimmune hepatitis, hepatitis C (from a contaminated blood transfusion) s/p interferon/ribavirin with f/u subsequent negative viral loads, hydronephrosis, autoimmune hepatitis, osteoporosis, hematuria, and atrial flutter. 1. URI: The patient reports a 2 week history of sore throat, hoarseness, and a productive cough associated with generalized fatigue and rhinorrhea/congestion off/on. Her sore throat was a 10 out of 10 on the pain scale initially. Sore throat symptoms improved thereafter. Her cough has worsened. She is now coughing up clear to yellow sputum. No relief with NSAIDs and Robitussin rx. 2.  Mixed connective tissue disease and Abnormal Urine protein screen: Patient has an appointment tomorrow with the nephrology team given a slightly abnormal urine protein screen done by her rheumatology team.  A follow-up urine protein screen was obtained since her last appointment and unremarkable for kidney injury. Her labs today revealed underlying positive RNP antibody. She has history of Sjogren's disease and autoimmune hepatitis. She is followed by the GI and rheumatology team.  She is not having any joint pains today. We reviewed the results of her retroperitoneal ultrasound today. Results are stable from previous studies. She had a CT urogram  in 2014. Findings of both the CT urogram and most recent retroperitoneal ultrasound show mild hydronephrosis more pronounced on the right kidney versus the left kidney.       Patient Active Problem List    Diagnosis Date Noted    Positive hepatitis C antibody test 12/04/2017    Mixed connective tissue disease ([facility] 11/20/2017    Osteoporosis 07/19/2017    Sjogren's syndrome ([facility] 10/05/2016    Autoimmune hepatitis ([facility] 07/13/2015    History of hematuria 11/21/2014    Atrial flutter (HCC) 10/28/2014       Current Outpatient Prescriptions   Medication Sig Dispense Refill    guaiFENesin-codeine (VIRTUSSIN AC) 100-10 mg/5 mL solution Take 5 mL by mouth three (3) times daily as needed for Cough.  azithromycin (ZITHROMAX) 250 mg tablet Take 500mg (2 tabs) on Day 1 and then 250mg (1 tab) daily on Days 2-5. 6 Tab 0    metoprolol succinate (TOPROL-XL) 25 mg XL tablet TAKE 1 TABLET BY MOUTH TWICE DAILY 60 Tab 3    metoprolol tartrate (LOPRESSOR) 25 mg tablet  1 tablet daily as needed 30 Tab 5    cyclobenzaprine (FLEXERIL) 10 mg tablet Take 10 mg by mouth daily as needed. 1 tab at bedtime  Indications: FIBROMYALGIA      calcium 500 mg Tab Take 500 mg by mouth. BID      multivitamin (ONE A DAY) tablet Take 1 Tab by mouth daily.  cycloSPORINE (RESTASIS) 0.05 % ophthalmic emulsion Administer 1 drop to both eyes daily.  FLAXSEED OIL (OMEGA 3 PO) Take  by mouth daily as needed. Allergies   Allergen Reactions    Adhesive Hives    Mercurochrome [Merbromin] Rash    Merthiolate (Thimerosal) Rash    Nasacort [Triamcinolone Acetonide] Other (comments)     congestion    Pcn [Penicillins] Rash and Other (comments)    Azatadine Maleate Other (comments)    Neomycin-Bacitracin-Polymyxin Other (comments)       Past Medical History:   Diagnosis Date    Abdominal pain     Autoimmune hepatitis ([facility] 4-24-15    Dr Trinity Oakes, s/p liver biopsy    Basal cell cancer     BPPV (benign paroxysmal positional vertigo)     Dr Mey Al Cardiac echocardiogram 11/11/2014    EF 50-55%. No WMA. Gr 1 DDfx. RVSP 26 mmHg.       Chronic lung disease     Degenerative disc disease, lumbar     Enlarged lymph nodes     celiac artery-follow up CT showed improvement in MAY 2015    Fracture     GERD (gastroesophageal reflux disease)     HCV (hepatitis C virus)     Hematuria     gross with xarelto-Dr Robin Wolf, Dr Maria C Jones: Oncology-Hematology-[facility]    Hemorrhage 1962 and 1963    Hepatitis C     treated    Hepatitis C 2002    Liver disease     Lumbar spondylosis with myelopathy     Ocular migraine     Osteoarthritis     Osteoporosis     Otosclerosis     Bilateral    Pinched nerve [id number]    C5 and C6    Scoliosis     Sjogren's syndrome ([facility] 1999    SVT (supraventricular tachycardia) ([facility] 10-28-14    Tachycardia     Transient global amnesia 9/4/07/, 4/12/12    x2    Wedging of vertebra ([facility]     Dry needling completed       Past Surgical History:   Procedure Laterality Date    HX ADENOIDECTOMY      HX APPENDECTOMY      HX COLONOSCOPY  9/2013    HX CYST INCISION AND DRAINAGE Bilateral 1973    HX TONSILLECTOMY         Family History   Problem Relation Age of Onset    Cancer Mother 76     stomach    Stroke Maternal Grandmother     Heart Disease Brother     Cancer Brother      Lyphoma    Stroke Father     Arthritis-osteo Sister     Osteoporosis Sister     No Known Problems Maternal Grandfather     Osteoporosis Paternal Grandmother     Osteoporosis Paternal Grandfather     Diabetes Brother        Social History   Substance Use Topics    Smoking status: Former Smoker     Packs/day: 1.50     Years: 10.00     Types: Cigarettes     Quit date: 9/18/1973    Smokeless tobacco: Never Used    Alcohol use No       ROS   Review of Systems   Constitutional: Positive for malaise/fatigue. Negative for chills and fever. HENT: Positive for congestion, hearing loss (chronic) and sore throat. Negative for ear pain. Eyes: Negative for blurred vision and pain. Respiratory: Positive for cough and sputum production. Negative for shortness of breath. Cardiovascular: Negative for chest pain. Gastrointestinal: Negative for abdominal pain, blood in stool and melena. Genitourinary: Negative for dysuria and hematuria.    Musculoskeletal: Negative for joint pain and myalgias. Skin: Negative for rash. Neurological: Negative for tingling, focal weakness and headaches. Endo/Heme/Allergies: Does not bruise/bleed easily. Psychiatric/Behavioral: Negative for substance abuse. Objective     Vitals:    12/05/17 1529   BP: 124/56   Pulse: 64   Resp: 12   Temp: 98.1 °F (36.7 °C)   TempSrc: Oral   SpO2: 100%   Weight: 119 lb 9.6 oz (54.3 kg)   Height: 5' 3\" (1.6 m)   PainSc:   2   PainLoc: Throat       Physical Exam   Constitutional: She is oriented to person, place, and time and well-developed, well-nourished, and in no distress. HENT:   Head: Normocephalic and atraumatic. Right Ear: External ear normal.   Left Ear: External ear normal.   Nose: Nose normal.   Mouth/Throat: Oropharynx is clear and moist. No oropharyngeal exudate. Clear TMs. Frontal/maxillary sinus areas are NTTP   Eyes: Conjunctivae and EOM are normal. Pupils are equal, round, and reactive to light. Right eye exhibits no discharge. Left eye exhibits no discharge. No scleral icterus. Neck: Neck supple. Cardiovascular: Normal rate, regular rhythm, normal heart sounds and intact distal pulses. Exam reveals no gallop and no friction rub. No murmur heard. Pulmonary/Chest: Effort normal and breath sounds normal. No respiratory distress. She has no wheezes. She has no rales. Abdominal: Soft. Bowel sounds are normal. She exhibits no distension. There is no tenderness. There is no rebound and no guarding. No hepatomegaly. No splenomegaly   Musculoskeletal: She exhibits no edema (bue are NTTP) or tenderness (BUE). Lymphadenopathy:     She has no cervical adenopathy. Neurological: She is alert and oriented to person, place, and time. She exhibits normal muscle tone. Gait normal.   Skin: Skin is warm and dry. No erythema. Psychiatric: Affect normal.   Nursing note and vitals reviewed.       LABS   Data Review:   Lab Results   Component Value Date/Time    WBC 4.6 11/17/2017 10:45 AM    HGB 13.1 11/17/2017 10:45 AM    HCT 40.6 11/17/2017 10:45 AM    PLATELET 422 28/17/7745 10:45 AM    MCV 92.7 11/17/2017 10:45 AM       Lab Results   Component Value Date/Time    Sodium 140 11/17/2017 10:45 AM    Potassium 4.0 11/17/2017 10:45 AM    Chloride 104 11/17/2017 10:45 AM    CO2 28 11/17/2017 10:45 AM    Anion gap 8 11/17/2017 10:45 AM    Glucose 86 11/17/2017 10:45 AM    BUN 12 11/17/2017 10:45 AM    Creatinine 0.73 11/17/2017 10:45 AM    BUN/Creatinine ratio 16 11/17/2017 10:45 AM    GFR est AA >60 11/17/2017 10:45 AM    GFR est non-AA >60 11/17/2017 10:45 AM    Calcium 9.5 11/17/2017 10:45 AM       Lab Results   Component Value Date/Time    Cholesterol, total 185 11/30/2016 08:30 AM    HDL Cholesterol 74 11/30/2016 08:30 AM    LDL, calculated 92.6 11/30/2016 08:30 AM    VLDL, calculated 18.4 11/30/2016 08:30 AM    Triglyceride 92 11/30/2016 08:30 AM    CHOL/HDL Ratio 2.5 11/30/2016 08:30 AM       No results found for: HBA1C, HGBE8, OGW4PBKD, RJG9BGVP, SWP3AARU    Assessment/Plan:   1. URI: PE is reassuring but given that most of her sx are worsening, will treat for acute bronchitis - given how sx have worsened over the past 2 wks. Her rapid strep test is negative today  -Azithromycin ordered. The patient was instructed to notify me if she develops any vaginal discharge and/or diarrhea. She was instructed to take probiotics while on antibiotics. -If she does not improve, I will order a chest x-ray and lab studies. She was instructed to return to clinic if her symptoms worsen. ORDERS:  - AMB POC RAPID STREP A    2. Mixed connective tissue disease: Positive RNP Ab. +Recent abnormal urine protein at the Rheumatology team was likely contaminated. F/u urine studies and labs are reassuring. Her retroperitoneal ultrasound shows no new findings.  Pt is furthermore asymptomatic.  -I encourage the patient to follow-up with her rheumatology team.  -She can follow-up tomorrow with the nephrology team for completeness. Health Maintenance Due   Topic Date Due    BREAST CANCER SCRN MAMMOGRAM  12/12/2017     Lab review: labs are reviewed in the EHR    I have discussed the diagnosis with the patient and the intended plan as seen in the above orders. The patient has received an after-visit summary and questions were answered concerning future plans. I have discussed medication side effects and warnings with the patient as well. I have reviewed the plan of care with the patient, accepted their input and they are in agreement with the treatment goals. All questions were answered. The patient understands the plan of care. Handouts provided today with above information. Pt instructed if symptoms worsen to call the office or report to the ED for continued care. Greater than 50% of the visit time was spent in counseling and/or coordination of care. Follow-up Disposition:  Return if symptoms worsen or fail to improve.     Tammy Cannon MD

## 2017-12-15 NOTE — TELEPHONE ENCOUNTER
----- Message from Jaqueline Atkins MD sent at 12/15/2017  7:28 AM EST -----  Please let Ms. Argelia Bo know that her mammogram show no suspicious findings for breast cancer. This study should be repeated in 1 year to screen for breast cancer.     Dr. Erica Brennan  [facility] [address] [address]  Phone: [phone]  Fax: [phone]

## 2017-12-15 NOTE — PROGRESS NOTES
Please let Ms. Zo Mullins know that her mammogram show no suspicious findings for breast cancer. This study should be repeated in 1 year to screen for breast cancer.     Dr. Lori Wilkerson  [facility] [address]  Phone: [phone]  Fax: [phone]

## 2018-01-01 ENCOUNTER — OFFICE VISIT (OUTPATIENT)
Dept: INTERNAL MEDICINE CLINIC | Age: 73
End: 2018-01-01

## 2018-01-01 ENCOUNTER — OFFICE VISIT (OUTPATIENT)
Dept: CARDIOLOGY CLINIC | Age: 73
End: 2018-01-01

## 2018-01-01 ENCOUNTER — TELEPHONE (OUTPATIENT)
Dept: INTERNAL MEDICINE CLINIC | Age: 73
End: 2018-01-01

## 2018-01-01 ENCOUNTER — TELEPHONE (OUTPATIENT)
Dept: PULMONOLOGY | Age: 73
End: 2018-01-01

## 2018-01-01 ENCOUNTER — HOSPITAL ENCOUNTER (OUTPATIENT)
Dept: LAB | Age: 73
Discharge: HOME | End: 2018-05-21
Payer: MEDICARE

## 2018-01-01 ENCOUNTER — HOSPITAL ENCOUNTER (OUTPATIENT)
Dept: LAB | Age: 73
Discharge: HOME | End: 2018-07-18
Payer: MEDICARE

## 2018-01-01 VITALS
DIASTOLIC BLOOD PRESSURE: 57 MMHG | SYSTOLIC BLOOD PRESSURE: 115 MMHG | TEMPERATURE: 97.2 F | BODY MASS INDEX: 20.55 KG/M2 | HEIGHT: 63 IN | OXYGEN SATURATION: 98 % | HEART RATE: 61 BPM | RESPIRATION RATE: 18 BRPM | WEIGHT: 116 LBS

## 2018-01-01 VITALS
HEIGHT: 63 IN | DIASTOLIC BLOOD PRESSURE: 56 MMHG | OXYGEN SATURATION: 100 % | SYSTOLIC BLOOD PRESSURE: 109 MMHG | BODY MASS INDEX: 20.55 KG/M2 | RESPIRATION RATE: 12 BRPM | WEIGHT: 116 LBS | HEART RATE: 68 BPM | TEMPERATURE: 97.2 F

## 2018-01-01 VITALS
BODY MASS INDEX: 20.38 KG/M2 | HEIGHT: 63 IN | DIASTOLIC BLOOD PRESSURE: 86 MMHG | SYSTOLIC BLOOD PRESSURE: 140 MMHG | HEART RATE: 65 BPM | WEIGHT: 115 LBS

## 2018-01-01 VITALS
HEIGHT: 63 IN | BODY MASS INDEX: 21.26 KG/M2 | TEMPERATURE: 97.8 F | HEART RATE: 63 BPM | WEIGHT: 120 LBS | DIASTOLIC BLOOD PRESSURE: 57 MMHG | RESPIRATION RATE: 18 BRPM | OXYGEN SATURATION: 100 % | SYSTOLIC BLOOD PRESSURE: 127 MMHG

## 2018-01-01 DIAGNOSIS — K75.4 AUTOIMMUNE HEPATITIS (HCC): ICD-10-CM

## 2018-01-01 DIAGNOSIS — I48.3 TYPICAL ATRIAL FLUTTER (HCC): ICD-10-CM

## 2018-01-01 DIAGNOSIS — M35.1 MIXED CONNECTIVE TISSUE DISEASE (HCC): ICD-10-CM

## 2018-01-01 DIAGNOSIS — G25.81 RLS (RESTLESS LEGS SYNDROME): ICD-10-CM

## 2018-01-01 DIAGNOSIS — J20.9 ACUTE BRONCHITIS, UNSPECIFIED ORGANISM: ICD-10-CM

## 2018-01-01 DIAGNOSIS — L65.9 ALOPECIA: ICD-10-CM

## 2018-01-01 DIAGNOSIS — M35.1 MIXED CONNECTIVE TISSUE DISEASE (HCC): Primary | ICD-10-CM

## 2018-01-01 DIAGNOSIS — K75.4 AUTOIMMUNE HEPATITIS (HCC): Primary | ICD-10-CM

## 2018-01-01 DIAGNOSIS — R00.2 PALPITATIONS: Primary | ICD-10-CM

## 2018-01-01 DIAGNOSIS — R74.01 TRANSAMINITIS: ICD-10-CM

## 2018-01-01 DIAGNOSIS — J06.9 UPPER RESPIRATORY TRACT INFECTION, UNSPECIFIED TYPE: ICD-10-CM

## 2018-01-01 DIAGNOSIS — L98.9 SKIN LESION: Primary | ICD-10-CM

## 2018-01-01 DIAGNOSIS — J11.1 INFLUENZA: Primary | ICD-10-CM

## 2018-01-01 LAB
ALBUMIN SERPL-MCNC: 3.9 G/DL (ref 3.4–5)
ALBUMIN SERPL-MCNC: 4 G/DL (ref 3.4–5)
ALBUMIN/GLOB SERPL: 1 {RATIO} (ref 0.8–1.7)
ALP SERPL-CCNC: 63 U/L (ref 45–117)
ALT SERPL-CCNC: 44 U/L (ref 13–56)
ANION GAP SERPL CALC-SCNC: 6 MMOL/L (ref 3–18)
ANION GAP SERPL CALC-SCNC: 7 MMOL/L (ref 3–18)
APPEARANCE UR: CLEAR
AST SERPL-CCNC: 41 U/L (ref 15–37)
BACTERIA URNS QL MICRO: ABNORMAL /HPF
BASOPHILS # BLD: 0 K/UL (ref 0–0.1)
BASOPHILS NFR BLD: 1 % (ref 0–2)
BILIRUB SERPL-MCNC: 0.5 MG/DL (ref 0.2–1)
BILIRUB UR QL: NEGATIVE
BUN SERPL-MCNC: 13 MG/DL (ref 7–18)
BUN SERPL-MCNC: 15 MG/DL (ref 7–18)
BUN/CREAT SERPL: 17 (ref 12–20)
BUN/CREAT SERPL: 17 (ref 12–20)
CALCIUM SERPL-MCNC: 8.9 MG/DL (ref 8.5–10.1)
CALCIUM SERPL-MCNC: 9.2 MG/DL (ref 8.5–10.1)
CENTROMERE B AB SER-ACNC: <0.2 AI (ref 0–0.9)
CHLORIDE SERPL-SCNC: 102 MMOL/L (ref 100–108)
CHLORIDE SERPL-SCNC: 105 MMOL/L (ref 100–108)
CHROMATIN AB SERPL-ACNC: <0.2 AI (ref 0–0.9)
CO2 SERPL-SCNC: 29 MMOL/L (ref 21–32)
CO2 SERPL-SCNC: 31 MMOL/L (ref 21–32)
COLOR UR: YELLOW
CREAT SERPL-MCNC: 0.76 MG/DL (ref 0.6–1.3)
CREAT SERPL-MCNC: 0.9 MG/DL (ref 0.6–1.3)
CREAT UR-MCNC: 37.43 MG/DL (ref 30–125)
CREAT UR-MCNC: <13 MG/DL (ref 30–125)
CREATININE, EXTERNAL: 0.76
CRP SERPL-MCNC: <0.3 MG/DL (ref 0–0.3)
DIFFERENTIAL METHOD BLD: ABNORMAL
DSDNA AB SER-ACNC: <1 IU/ML (ref 0–9)
ENA JO1 AB SER-ACNC: <0.2 AI (ref 0–0.9)
ENA RNP AB SER-ACNC: 0.8 AI (ref 0–0.9)
ENA SCL70 AB SER-ACNC: <0.2 AI (ref 0–0.9)
ENA SM AB SER-ACNC: <0.2 AI (ref 0–0.9)
ENA SS-A AB SER-ACNC: 7 AI (ref 0–0.9)
ENA SS-B AB SER-ACNC: <0.2 AI (ref 0–0.9)
EOSINOPHIL # BLD: 0.1 K/UL (ref 0–0.4)
EOSINOPHIL NFR BLD: 3 % (ref 0–5)
EPITH CASTS URNS QL MICRO: ABNORMAL /LPF (ref 0–5)
ERYTHROCYTE [DISTWIDTH] IN BLOOD BY AUTOMATED COUNT: 14.6 % (ref 11.6–14.5)
GLOBULIN SER CALC-MCNC: 4 G/DL (ref 2–4)
GLUCOSE SERPL-MCNC: 64 MG/DL (ref 74–99)
GLUCOSE SERPL-MCNC: 78 MG/DL (ref 74–99)
GLUCOSE UR STRIP.AUTO-MCNC: NEGATIVE MG/DL
HCT VFR BLD AUTO: 41.6 % (ref 35–45)
HGB BLD-MCNC: 13.5 G/DL (ref 12–16)
HGB UR QL STRIP: NEGATIVE
KETONES UR QL STRIP.AUTO: NEGATIVE MG/DL
LEUKOCYTE ESTERASE UR QL STRIP.AUTO: NEGATIVE
LYMPHOCYTES # BLD: 1.2 K/UL (ref 0.9–3.6)
LYMPHOCYTES NFR BLD: 28 % (ref 21–52)
MCH RBC QN AUTO: 30.5 PG (ref 24–34)
MCHC RBC AUTO-ENTMCNC: 32.5 G/DL (ref 31–37)
MCV RBC AUTO: 94.1 FL (ref 74–97)
MICROALBUMIN UR-MCNC: 0.5 MG/DL (ref 0–3)
MICROALBUMIN/CREAT UR-RTO: 13 MG/G (ref 0–30)
MONOCYTES # BLD: 0.6 K/UL (ref 0.05–1.2)
MONOCYTES NFR BLD: 13 % (ref 3–10)
NEUTS SEG # BLD: 2.4 K/UL (ref 1.8–8)
NEUTS SEG NFR BLD: 55 % (ref 40–73)
NITRITE UR QL STRIP.AUTO: NEGATIVE
PH UR STRIP: 7.5 [PH] (ref 5–8)
PHOSPHATE SERPL-MCNC: 4 MG/DL (ref 2.5–4.9)
PLATELET # BLD AUTO: 197 K/UL (ref 135–420)
PMV BLD AUTO: 11.1 FL (ref 9.2–11.8)
POTASSIUM SERPL-SCNC: 4.1 MMOL/L (ref 3.5–5.5)
POTASSIUM SERPL-SCNC: 4.5 MMOL/L (ref 3.5–5.5)
PROT SERPL-MCNC: 8 G/DL (ref 6.4–8.2)
PROT UR STRIP-MCNC: NEGATIVE MG/DL
PROT UR-MCNC: <6 MG/DL
PROT/CREAT UR-RTO: ABNORMAL
RBC # BLD AUTO: 4.42 M/UL (ref 4.2–5.3)
RBC #/AREA URNS HPF: ABNORMAL /HPF (ref 0–5)
SEE BELOW, 164869: ABNORMAL
SODIUM SERPL-SCNC: 139 MMOL/L (ref 136–145)
SODIUM SERPL-SCNC: 141 MMOL/L (ref 136–145)
SP GR UR REFRACTOMETRY: <1.005 (ref 1–1.03)
T4 FREE SERPL-MCNC: 1 NG/DL (ref 0.7–1.5)
TSH SERPL DL<=0.05 MIU/L-ACNC: 1.4 UIU/ML (ref 0.36–3.74)
UROBILINOGEN UR QL STRIP.AUTO: 0.2 EU/DL (ref 0.2–1)
WBC # BLD AUTO: 4.4 K/UL (ref 4.6–13.2)
WBC URNS QL MICRO: ABNORMAL /HPF (ref 0–4)

## 2018-01-01 PROCEDURE — 81001 URINALYSIS AUTO W/SCOPE: CPT | Performed by: INTERNAL MEDICINE

## 2018-01-01 PROCEDURE — 86140 C-REACTIVE PROTEIN: CPT | Performed by: INTERNAL MEDICINE

## 2018-01-01 PROCEDURE — 86225 DNA ANTIBODY NATIVE: CPT | Performed by: INTERNAL MEDICINE

## 2018-01-01 PROCEDURE — 85025 COMPLETE CBC W/AUTO DIFF WBC: CPT | Performed by: INTERNAL MEDICINE

## 2018-01-01 PROCEDURE — 84156 ASSAY OF PROTEIN URINE: CPT | Performed by: INTERNAL MEDICINE

## 2018-01-01 PROCEDURE — 82043 UR ALBUMIN QUANTITATIVE: CPT | Performed by: INTERNAL MEDICINE

## 2018-01-01 PROCEDURE — 36415 COLL VENOUS BLD VENIPUNCTURE: CPT | Performed by: INTERNAL MEDICINE

## 2018-01-01 PROCEDURE — 84439 ASSAY OF FREE THYROXINE: CPT | Performed by: INTERNAL MEDICINE

## 2018-01-01 PROCEDURE — 80069 RENAL FUNCTION PANEL: CPT | Performed by: INTERNAL MEDICINE

## 2018-01-01 PROCEDURE — 80053 COMPREHEN METABOLIC PANEL: CPT | Performed by: INTERNAL MEDICINE

## 2018-01-01 RX ORDER — METOPROLOL SUCCINATE 25 MG/1
TABLET, EXTENDED RELEASE ORAL
Qty: 180 TAB | Refills: 3 | Status: SHIPPED | OUTPATIENT
Start: 2018-01-01

## 2018-01-01 RX ORDER — ALBUTEROL SULFATE 90 UG/1
AEROSOL, METERED RESPIRATORY (INHALATION)
COMMUNITY

## 2018-01-12 NOTE — MR AVS SNAPSHOT
Visit Information Date & Time Provider Department Dept. Phone Encounter #  
 1/12/2018  8:30 AM Juanita Park MD [facility] [place] [id number] Follow-up Instructions Return in about 6 months (around 7/12/2018) for BP check. Your Appointments 5/4/2018  8:40 AM  
Follow Up with General Juni MD  
[facility] ([facility]) Appt Note: 6 month follow up [place] [id number] [address] [address] [phone] [address] [place] [address] [address] Upcoming Health Maintenance Date Due COLONOSCOPY 9/24/2018 MEDICARE YEARLY EXAM 11/18/2018 BREAST CANCER SCRN MAMMOGRAM 12/13/2018 GLAUCOMA SCREENING Q2Y 2/23/2019 DTaP/Tdap/Td series (2 - Td) 10/5/2026 Allergies as of 1/12/2018  Review Complete On: 1/12/2018 By: Steven Shields LPN Severity Noted Reaction Type Reactions Adhesive High 09/18/2013    Hives Mercurochrome [Merbromin] High 09/18/2013    Rash Merthiolate (Thimerosal) High 09/18/2013    Rash Nasacort [Triamcinolone Acetonide] High 09/18/2013    Other (comments)  
 congestion Pcn [Penicillins] Medium 06/01/2011    Rash, Other (comments) Azatadine Maleate  06/21/2011    Other (comments) Neomycin-bacitracin-polymyxin  06/21/2011    Other (comments) Current Immunizations  Reviewed on 8/24/2017 No immunizations on file. Not reviewed this visit Vitals BP Pulse Temp Resp Height(growth percentile) Weight(growth percentile) 127/57 63 97.8 °F (36.6 °C) 18 5' 3\" (1.6 m) 120 lb (54.4 kg) SpO2 BMI OB Status Smoking Status 100% 21.26 kg/m2 Postmenopausal Former Smoker Vitals History BMI and BSA Data Body Mass Index Body Surface Area  
 21.26 kg/m 2 1.56 m 2 Preferred Pharmacy Pharmacy Name Phone  [address] [facility] [phone] Your Updated Medication List  
  
   
This list is accurate as of: 1/12/18  8:50 AM.  Always use your most recent med list.  
  
  
  
  
 calcium 500 mg Tab Take 500 mg by mouth. BID FLEXERIL 10 mg tablet Generic drug:  cyclobenzaprine Take 10 mg by mouth daily as needed. 1 tab at bedtime  Indications: FIBROMYALGIA  
  
 metoprolol succinate 25 mg XL tablet Commonly known as:  TOPROL-XL  
TAKE 1 TABLET BY MOUTH TWICE DAILY  
  
 metoprolol tartrate 25 mg tablet Commonly known as:  LOPRESSOR  
1 tablet daily as needed  
  
 multivitamin tablet Commonly known as:  ONE A DAY Take 1 Tab by mouth daily. OMEGA 3 PO Take  by mouth daily as needed. RESTASIS 0.05 % ophthalmic emulsion Generic drug:  cycloSPORINE Administer 1 drop to both eyes daily. Follow-up Instructions Return in about 6 months (around 7/12/2018) for BP check. Patient Instructions Well Visit, Over 72: Care Instructions Your Care Instructions Physical exams can help you stay healthy. Your doctor has checked your overall health and may have suggested ways to take good care of yourself. He or she also may have recommended tests. At home, you can help prevent illness with healthy eating, regular exercise, and other steps. Follow-up care is a key part of your treatment and safety. Be sure to make and go to all appointments, and call your doctor if you are having problems. It's also a good idea to know your test results and keep a list of the medicines you take. How can you care for yourself at home? · Reach and stay at a healthy weight. This will lower your risk for many problems, such as obesity, diabetes, heart disease, and high blood pressure. · Get at least 30 minutes of exercise on most days of the week. Walking is a good choice.  You also may want to do other activities, such as running, swimming, cycling, or playing tennis or team sports. · Do not smoke. Smoking can make health problems worse. If you need help quitting, talk to your doctor about stop-smoking programs and medicines. These can increase your chances of quitting for good. · Protect your skin from too much sun. When you're outdoors from 10 a.m. to 4 p.m., stay in the shade or cover up with clothing and a hat with a wide brim. Wear sunglasses that block UV rays. Even when it's cloudy, put broad-spectrum sunscreen (SPF 30 or higher) on any exposed skin. · See a dentist one or two times a year for checkups and to have your teeth cleaned. · Wear a seat belt in the car. · Limit alcohol to 2 drinks a day for men and 1 drink a day for women. Too much alcohol can cause health problems. Follow your doctor's advice about when to have certain tests. These tests can spot problems early. For men and women · Cholesterol. Your doctor will tell you how often to have this done based on your overall health and other things that can increase your risk for heart attack and stroke. · Blood pressure. Have your blood pressure checked during a routine doctor visit. Your doctor will tell you how often to check your blood pressure based on your age, your blood pressure results, and other factors. · Diabetes. Ask your doctor whether you should have tests for diabetes. · Vision. Experts recommend that you have yearly exams for glaucoma and other age-related eye problems. · Hearing. Tell your doctor if you notice any change in your hearing. You can have tests to find out how well you hear. · Colon cancer tests. Keep having colon cancer tests as your doctor recommends. You can have one of several types of tests. · Heart attack and stroke risk. At least every 4 to 6 years, you should have your risk for heart attack and stroke assessed.  Your doctor uses factors such as your age, blood pressure, cholesterol, and whether you smoke or have diabetes to show what your risk for a heart attack or stroke is over the next 10 years. · Osteoporosis. Talk to your doctor about whether you should have a bone density test to find out whether you have thinning bones. Also ask your doctor about whether you should take calcium and vitamin D supplements. For women · Pap test and pelvic exam. You may no longer need a Pap test. Talk with your doctor about whether to stop or continue to have Pap tests. · Breast exam and mammogram. Ask how often you should have a mammogram, which is an X-ray of your breasts. A mammogram can spot breast cancer before it can be felt and when it is easiest to treat. · Thyroid disease. Talk to your doctor about whether to have your thyroid checked as part of a regular physical exam. Women have an increased chance of a thyroid problem. For men · Prostate exam. Talk to your doctor about whether you should have a blood test (called a PSA test) for prostate cancer. Experts disagree on whether men should have this test. Some experts recommend that you discuss the benefits and risks of the test with your doctor. · Abdominal aortic aneurysm. Ask your doctor whether you should have a test to check for an aneurysm. You may need a test if you ever smoked or if your parent, brother, sister, or child has had an aneurysm. When should you call for help? Watch closely for changes in your health, and be sure to contact your doctor if you have any problems or symptoms that concern you. Where can you learn more? Go to http://pratik-niki.info/. Enter R935 in the search box to learn more about \"Well Visit, Over 65: Care Instructions. \" Current as of: May 12, 2017 Content Version: 11.4 © 8248-6853 "[employer]. Care instructions adapted under license by [employer] (which disclaims liability or warranty for this information).  If you have questions about a medical condition or this instruction, always ask your healthcare professional. [address] any warranty or liability for your use of this information. Introducing [facility]! Hodan Millard introduces [employer] Hero patient portal. Now you can access parts of your medical record, email your doctor's office, and request medication refills online. 1. In your internet browser, go to [email or web address] 2. Click on the First Time User? Click Here link in the Sign In box. You will see the New Member Sign Up page. 3. Enter your [employer] Hero Access Code exactly as it appears below. You will not need to use this code after youve completed the sign-up process. If you do not sign up before the expiration date, you must request a new code. · [employer] Hero Access Code: [email or web address] Expires: 1/30/2018  7:17 AM 
 
4. Enter the last four digits of your Social Security Number (xxxx) and Date of Birth (mm/dd/yyyy) as indicated and click Submit. You will be taken to the next sign-up page. 5. Create a [employer] Hero ID. This will be your [employer] Hero login ID and cannot be changed, so think of one that is secure and easy to remember. 6. Create a [employer] Hero password. You can change your password at any time. 7. Enter your Password Reset Question and Answer. This can be used at a later time if you forget your password. 8. Enter your e-mail address. You will receive e-mail notification when new information is available in [address] 9. Click Sign Up. You can now view and download portions of your medical record. 10. Click the Download Summary menu link to download a portable copy of your medical information. If you have questions, please visit the Frequently Asked Questions section of the [employer] Hero website. Remember, [employer] is NOT to be used for urgent needs. For medical emergencies, dial 911. Now available from your iPhone and Android! Please provide this summary of care documentation to your next provider. Your primary care clinician is listed as Conrad Hernandez. If you have any questions after today's visit, please call [phone].

## 2018-01-12 NOTE — PROGRESS NOTES
1. Have you been to the ER, urgent care clinic since your last visit? Hospitalized since your last visit? No    2. Have you seen or consulted any other health care providers outside of the [address] [place] [address] since your last visit? Include any pap smears or colon screening.  No

## 2018-01-12 NOTE — PATIENT INSTRUCTIONS

## 2018-03-27 NOTE — TELEPHONE ENCOUNTER
From 11/01/2017 A/P from Dr Sid Ingram note. \" She is now taking a higher dose of metoprolol XL at 25 mg twice daily. \"

## 2018-04-23 NOTE — TELEPHONE ENCOUNTER
Per pt she is still coughing, sob. She was seen 4/17 at [facility], they gave her an albuterol inhaler and steroid treatment.

## 2018-04-23 NOTE — TELEPHONE ENCOUNTER
Pt calling to check on the Albuteral Inhaler dose [place] prescribed for her. It is to be used every 4 hrs as needed for sob. Advised yes that is a normal correct dose. Pt states she is also going to PCP on Wednesday for follow up.  She states she has not had to use the rescue inhaler since she left [employer]

## 2018-04-25 NOTE — PROGRESS NOTES
[facility] [place]:  4/26/2018, MRN: [id number]      Joseph Wynn is a 67 y.o. female and presents to clinic for Hospital Follow Up (went to [facility] daignosed with flu)    Subjective:   Pt is a 65yo female with h/o sjogren's syndrome per EHR, positive RNP Ab,  autoimmune hepatitis, hepatitis C (from a contaminated blood transfusion) s/p interferon/ribavirin with f/u subsequent negative viral loads, hydronephrosis, autoimmune hepatitis, osteoporosis, hematuria, and atrial flutter. Influenza: She was diagnosed with influenza type B at an urgent care facility. She took 5 days of tamiflu. No adverse side effects from the rx. She had a persistent cough though. Sx prompted her to go to the ED. At [facility] ED, she was diagnosed with acute bronchitis. She was prescribed a steroid rx and was prescribed an albuterol inhaler. She took these rx. Her productive cough is slowly improving. She reports ear congestion b/l. No ear pain. She's used Vicks OTC rx prn symptomatic relief. She initially had a sore throat but sx resolved eventually. She has also been taking Robitussin off and on. Patient Active Problem List    Diagnosis Date Noted    Positive hepatitis C antibody test 12/04/2017    Mixed connective tissue disease ([facility] 11/20/2017    Osteoporosis 07/19/2017    Sjogren's syndrome ([facility] 10/05/2016    Autoimmune hepatitis ([facility] 07/13/2015    History of hematuria 11/21/2014    Atrial flutter (HCC) 10/28/2014       Current Outpatient Prescriptions   Medication Sig Dispense Refill    metoprolol succinate (TOPROL-XL) 25 mg XL tablet TAKE 1 TABLET BY MOUTH TWICE DAILY 180 Tab 3    cyclobenzaprine (FLEXERIL) 10 mg tablet Take 10 mg by mouth daily as needed. 1 tab at bedtime  Indications: FIBROMYALGIA      calcium 500 mg Tab Take 500 mg by mouth. BID      multivitamin (ONE A DAY) tablet Take 1 Tab by mouth daily.       cycloSPORINE (RESTASIS) 0.05 % ophthalmic emulsion Administer 1 drop to both eyes daily.  FLAXSEED OIL (OMEGA 3 PO) Take  by mouth daily as needed. Allergies   Allergen Reactions    Adhesive Hives    Mercurochrome [Merbromin] Rash    Merthiolate (Thimerosal) Rash    Nasacort [Triamcinolone Acetonide] Other (comments)     congestion    Pcn [Penicillins] Rash and Other (comments)    Azatadine Maleate Other (comments)    Neomycin-Bacitracin-Polymyxin Other (comments)       Past Medical History:   Diagnosis Date    Abdominal pain     Autoimmune hepatitis ([address] 4-24-15    Dr Vela Favorite, s/p liver biopsy    Basal cell cancer     BPPV (benign paroxysmal positional vertigo)     Dr Ulisses Hernandez Cardiac echocardiogram 11/11/2014    EF 50-55%. No WMA. Gr 1 DDfx. RVSP 26 mmHg.       Chronic lung disease     Degenerative disc disease, lumbar     Enlarged lymph nodes     celiac artery-follow up CT showed improvement in MAY 2015    Fracture     GERD (gastroesophageal reflux disease)     HCV (hepatitis C virus)     Hematuria     gross with xarelto-Dr Candy Negron, Dr Denny Sins: Oncology-Hematology-[facility]    Hemorrhage 1962 and 1963    Hepatitis C     treated    Hepatitis C 2002    Liver disease     Lumbar spondylosis with myelopathy     Ocular migraine     Osteoarthritis     Osteoporosis     Otosclerosis     Bilateral    Pinched nerve [id number]    C5 and C6    Scoliosis     Sjogren's syndrome ([address] 1999    SVT (supraventricular tachycardia) ([address] 10-28-14    Tachycardia     Transient global amnesia 9/4/07/, 4/12/12    x2    Wedging of vertebra ([address]     Dry needling completed       Past Surgical History:   Procedure Laterality Date    HX ADENOIDECTOMY      HX APPENDECTOMY      HX COLONOSCOPY  9/2013    HX CYST INCISION AND DRAINAGE Bilateral 1973    HX TONSILLECTOMY         Family History   Problem Relation Age of Onset    Cancer Mother 76     stomach    Stroke Maternal Grandmother     Heart Disease Brother     Cancer Brother      Lyphoma    Stroke Father  Arthritis-osteo Sister     Osteoporosis Sister     No Known Problems Maternal Grandfather     Osteoporosis Paternal Grandmother     Osteoporosis Paternal Grandfather     Diabetes Brother        Social History   Substance Use Topics    Smoking status: Former Smoker     Packs/day: 1.50     Years: 10.00     Types: Cigarettes     Quit date: 9/18/1973    Smokeless tobacco: Never Used    Alcohol use No       ROS   Review of Systems   Constitutional: Positive for malaise/fatigue. Negative for chills and fever. HENT: Positive for congestion. Negative for ear pain and sore throat. Eyes: Negative for blurred vision and pain. Respiratory: Positive for cough. Negative for shortness of breath. Cardiovascular: Negative for chest pain. Gastrointestinal: Negative for abdominal pain, blood in stool and melena. Genitourinary: Negative for dysuria and hematuria. Musculoskeletal: Positive for joint pain. Negative for myalgias. Skin: Negative for rash. Neurological: Negative for tingling, focal weakness and headaches. Endo/Heme/Allergies: Does not bruise/bleed easily. Psychiatric/Behavioral: Negative for substance abuse. Objective     Vitals:    04/25/18 1429   BP: 115/57   Pulse: 61   Resp: 18   Temp: 97.2 °F (36.2 °C)   SpO2: 98%   Weight: 116 lb (52.6 kg)   Height: 5' 3\" (1.6 m)   PainSc:   0 - No pain       Physical Exam   Constitutional: She is oriented to person, place, and time and well-developed, well-nourished, and in no distress. HENT:   Head: Normocephalic and atraumatic. Right Ear: External ear normal.   Left Ear: External ear normal.   Nose: Nose normal.   Mouth/Throat: Oropharynx is clear and moist. No oropharyngeal exudate. Clear TMs. Sinus areas are NTTP   Eyes: Conjunctivae and EOM are normal. Right eye exhibits no discharge. Left eye exhibits no discharge. No scleral icterus. Neck: Neck supple.    Cardiovascular: Normal rate, regular rhythm, normal heart sounds and intact distal pulses. Exam reveals no gallop and no friction rub. No murmur heard. Pulmonary/Chest: Effort normal and breath sounds normal. No respiratory distress. She has no wheezes. She has no rales. Abdominal: Soft. Bowel sounds are normal. She exhibits no distension. There is no tenderness. There is no rebound and no guarding. No organomegaly   Musculoskeletal: She exhibits no edema or tenderness (BUE). Lymphadenopathy:     She has no cervical adenopathy. Neurological: She is alert and oriented to person, place, and time. She exhibits normal muscle tone. Gait normal.   Skin: Skin is warm and dry. No erythema. Psychiatric: Affect normal.   Nursing note and vitals reviewed. LABS   Data Review:   Lab Results   Component Value Date/Time    WBC 4.6 11/17/2017 10:45 AM    HGB 13.1 11/17/2017 10:45 AM    HCT 40.6 11/17/2017 10:45 AM    PLATELET 434 20/95/3841 10:45 AM    MCV 92.7 11/17/2017 10:45 AM       Lab Results   Component Value Date/Time    Sodium 140 11/17/2017 10:45 AM    Potassium 4.0 11/17/2017 10:45 AM    Chloride 104 11/17/2017 10:45 AM    CO2 28 11/17/2017 10:45 AM    Anion gap 8 11/17/2017 10:45 AM    Glucose 86 11/17/2017 10:45 AM    BUN 12 11/17/2017 10:45 AM    Creatinine 0.73 11/17/2017 10:45 AM    BUN/Creatinine ratio 16 11/17/2017 10:45 AM    GFR est AA >60 11/17/2017 10:45 AM    GFR est non-AA >60 11/17/2017 10:45 AM    Calcium 9.5 11/17/2017 10:45 AM       Lab Results   Component Value Date/Time    Cholesterol, total 185 11/30/2016 08:30 AM    HDL Cholesterol 74 (H) 11/30/2016 08:30 AM    LDL, calculated 92.6 11/30/2016 08:30 AM    VLDL, calculated 18.4 11/30/2016 08:30 AM    Triglyceride 92 11/30/2016 08:30 AM    CHOL/HDL Ratio 2.5 11/30/2016 08:30 AM       No results found for: HBA1C, HGBE8, CSA0DWTS, CLH3GHSI, ILJ7GMUS    Assessment/Plan:   Influenza and Acute Bronchitis:S/p tamiflu, steroid rx, and albuterol rx prn. PE findings are reassuring.  -Rest.  Hydration. Symptomatic relief with use of Mucinex and/or Robitussin over-the-counter.  -Return to clinic if symptoms worsen. Health Maintenance Due   Topic Date Due    COLONOSCOPY  09/24/2018     Lab review: labs are reviewed in the EHR    I have discussed the diagnosis with the patient and the intended plan as seen in the above orders. The patient has received an after-visit summary and questions were answered concerning future plans. I have discussed medication side effects and warnings with the patient as well. I have reviewed the plan of care with the patient, accepted their input and they are in agreement with the treatment goals. All questions were answered. The patient understands the plan of care. Handouts provided today with above information. Pt instructed if symptoms worsen to call the office or report to the ED for continued care. Greater than 50% of the visit time was spent in counseling and/or coordination of care. Follow-up Disposition:  Return if symptoms worsen or fail to improve.     Beverly Alejo MD

## 2018-04-25 NOTE — PROGRESS NOTES
1. Have you been to the ER, urgent care clinic since your last visit? Hospitalized since your last visit? Yes Reason for visit: flu    2. Have you seen or consulted any other health care providers outside of the [address] [place] [address] since your last visit? Include any pap smears or colon screening.  Yes Reason for visit: cardiology and pulmonology

## 2018-04-25 NOTE — PATIENT INSTRUCTIONS

## 2018-04-25 NOTE — MR AVS SNAPSHOT
[address] [facility] 
 
 
 [address] [facility], [facility] 200 [facility] 
[phone] Patient: Lincoln Yin MRN: [id number] :1945 Visit Information Date & Time Provider Department Dept. Phone Encounter #  
 2018  2:30 PM Elizabeth Heard MD [facility] [id number] Follow-up Instructions Return if symptoms worsen or fail to improve. Your Appointments 2018  8:40 AM  
Follow Up with Don Otero MD  
[facility] ([address]) Appt Note: 6 month follow up [facility] [address] [phone] [address] [place] [address] [address]  [address] 2018  8:00 AM  
Office Visit with Elizabeth Heard MD  
[facility] [address]) Appt Note: 6 month f/u  
 5445 [facility], [facility] 737 [facility] 455 [facility]  
  
   
 [address], [address] Upcoming Health Maintenance Date Due COLONOSCOPY 2018 MEDICARE YEARLY EXAM 2018 BREAST CANCER SCRN MAMMOGRAM 2018 GLAUCOMA SCREENING Q2Y 2019 DTaP/Tdap/Td series (2 - Td) 10/5/2026 Allergies as of 2018  Review Complete On: 2018 By: Elizabeth Heard MD  
  
 Severity Noted Reaction Type Reactions Adhesive High 2013    Hives Mercurochrome [Merbromin] High 2013    Rash Merthiolate (Thimerosal) High 2013    Rash Nasacort [Triamcinolone Acetonide] High 2013    Other (comments)  
 congestion Pcn [Penicillins] Medium 2011    Rash, Other (comments) Azatadine Maleate  2011    Other (comments) Neomycin-bacitracin-polymyxin  2011    Other (comments) Current Immunizations  Reviewed on 2017 No immunizations on file. Not reviewed this visit Vitals BP Pulse Temp Resp Height(growth percentile) Weight(growth percentile) 115/57 61 97.2 °F (36.2 °C) 18 5' 3\" (1.6 m) 116 lb (52.6 kg) SpO2 BMI OB Status Smoking Status 98% 20.55 kg/m2 Postmenopausal Former Smoker Vitals History BMI and BSA Data Body Mass Index Body Surface Area 20.55 kg/m 2 1.53 m 2 Preferred Pharmacy Pharmacy Name Phone [address], [address] [place] [address] 8[address] [facility] [phone] Your Updated Medication List  
  
   
This list is accurate as of 4/25/18  3:03 PM.  Always use your most recent med list.  
  
  
  
  
 calcium 500 mg Tab Take 500 mg by mouth. BID FLEXERIL 10 mg tablet Generic drug:  cyclobenzaprine Take 10 mg by mouth daily as needed. 1 tab at bedtime  Indications: FIBROMYALGIA  
  
 metoprolol succinate 25 mg XL tablet Commonly known as:  TOPROL-XL  
TAKE 1 TABLET BY MOUTH TWICE DAILY  
  
 multivitamin tablet Commonly known as:  ONE A DAY Take 1 Tab by mouth daily. OMEGA 3 PO Take  by mouth daily as needed. RESTASIS 0.05 % ophthalmic emulsion Generic drug:  cycloSPORINE Administer 1 drop to both eyes daily. Follow-up Instructions Return if symptoms worsen or fail to improve. Patient Instructions Influenza (Flu): Care Instructions Your Care Instructions Influenza (flu) is an infection in the lungs and breathing passages. It is caused by the influenza virus. There are different strains, or types, of the flu virus from year to year. Unlike the common cold, the flu comes on suddenly and the symptoms, such as a cough, congestion, fever, chills, fatigue, aches, and pains, are more severe. These symptoms may last up to 10 days. Although the flu can make you feel very sick, it usually doesn't cause serious health problems. Home treatment is usually all you need for flu symptoms.  But your doctor may prescribe antiviral medicine to prevent other health problems, such as pneumonia, from developing. Older people and those who have a long-term health condition, such as lung disease, are most at risk for having pneumonia or other health problems. Follow-up care is a key part of your treatment and safety. Be sure to make and go to all appointments, and call your doctor if you are having problems. It's also a good idea to know your test results and keep a list of the medicines you take. How can you care for yourself at home? · Get plenty of rest. 
· Drink plenty of fluids, enough so that your urine is light yellow or clear like water. If you have kidney, heart, or liver disease and have to limit fluids, talk with your doctor before you increase the amount of fluids you drink. · Take an over-the-counter pain medicine if needed, such as acetaminophen (Tylenol), ibuprofen (Advil, Motrin), or naproxen (Aleve), to relieve fever, headache, and muscle aches. Read and follow all instructions on the label. No one younger than 20 should take aspirin. It has been linked to Reye syndrome, a serious illness. · Do not smoke. Smoking can make the flu worse. If you need help quitting, talk to your doctor about stop-smoking programs and medicines. These can increase your chances of quitting for good. · Breathe moist air from a hot shower or from a sink filled with hot water to help clear a stuffy nose. · Before you use cough and cold medicines, check the label. These medicines may not be safe for young children or for people with certain health problems. · If the skin around your nose and lips becomes sore, put some petroleum jelly on the area. · To ease coughing: ¨ Drink fluids to soothe a scratchy throat. ¨ Suck on cough drops or plain hard candy. ¨ Take an over-the-counter cough medicine that contains dextromethorphan to help you get some sleep. Read and follow all instructions on the label. ¨ Raise your head at night with an extra pillow. This may help you rest if coughing keeps you awake. · Take any prescribed medicine exactly as directed. Call your doctor if you think you are having a problem with your medicine. To avoid spreading the flu · Wash your hands regularly, and keep your hands away from your face. · Stay home from school, work, and other public places until you are feeling better and your fever has been gone for at least 24 hours. The fever needs to have gone away on its own without the help of medicine. · Ask people living with you to talk to their doctors about preventing the flu. They may get antiviral medicine to keep from getting the flu from you. · To prevent the flu in the future, get a flu vaccine every fall. Encourage people living with you to get the vaccine. · Cover your mouth when you cough or sneeze. When should you call for help? Call 911 anytime you think you may need emergency care. For example, call if: 
? · You have severe trouble breathing. ?Call your doctor now or seek immediate medical care if: 
? · You have new or worse trouble breathing. ? · You seem to be getting much sicker. ? · You feel very sleepy or confused. ? · You have a new or higher fever. ? · You get a new rash. ? Watch closely for changes in your health, and be sure to contact your doctor if: 
? · You begin to get better and then get worse. ? · You are not getting better after 1 week. Where can you learn more? Go to http://pratik-niki.info/. Enter U771 in the search box to learn more about \"Influenza (Flu): Care Instructions. \" Current as of: May 12, 2017 Content Version: 11.4 © 3399-6253 [employer]. Care instructions adapted under license by [employer] (which disclaims liability or warranty for this information).  If you have questions about a medical condition or this instruction, always ask your healthcare professional. [employer] disclaims any warranty or liability for your use of this information. Introducing [facility] & HEALTH SERVICES! [facility] introduces Doorbot patient portal. Now you can access parts of your medical record, email your doctor's office, and request medication refills online. 1. In your internet browser, go to [email or web address] 2. Click on the First Time User? Click Here link in the Sign In box. You will see the New Member Sign Up page. 3. Enter your Doorbot Access Code exactly as it appears below. You will not need to use this code after youve completed the sign-up process. If you do not sign up before the expiration date, you must request a new code. · Doorbot Access Code: [email or web address] Expires: 7/24/2018  2:20 PM 
 
4. Enter the last four digits of your Social Security Number (xxxx) and Date of Birth (mm/dd/yyyy) as indicated and click Submit. You will be taken to the next sign-up page. 5. Create a Doorbot ID. This will be your Doorbot login ID and cannot be changed, so think of one that is secure and easy to remember. 6. Create a Doorbot password. You can change your password at any time. 7. Enter your Password Reset Question and Answer. This can be used at a later time if you forget your password. 8. Enter your e-mail address. You will receive e-mail notification when new information is available in [address] 9. Click Sign Up. You can now view and download portions of your medical record. 10. Click the Download Summary menu link to download a portable copy of your medical information. If you have questions, please visit the Frequently Asked Questions section of the Doorbot website. Remember, Doorbot is NOT to be used for urgent needs. For medical emergencies, dial 911. Now available from your iPhone and Android! Please provide this summary of care documentation to your next provider. Your primary care clinician is listed as Fransisco Leonardo. If you have any questions after today's visit, please call [phone].

## 2018-05-04 NOTE — MR AVS SNAPSHOT
[address] [address] [address] [address] 
[phone] Patient: Krystian Bach MRN: [id number] :1945 Visit Information Date & Time Provider Department Dept. Phone Encounter #  
 2018  8:40 AM Peola Kayser, MD [facility] [address] [id number] Your Appointments 2018  8:00 AM  
Office Visit with Shana Caldera MD  
[facility] [address]) Appt Note: 6 month f/u  
 54[address] [facility], [address] [address] [address] 455 [facility]  
  
   
 [address] [facility] Health Maintenance Date Due COLONOSCOPY 2018 Influenza Age 5 to Adult 2018 MEDICARE YEARLY EXAM 2018 BREAST CANCER SCRN MAMMOGRAM 2018 GLAUCOMA SCREENING Q2Y 2019 DTaP/Tdap/Td series (2 - Td) 10/5/2026 Allergies as of 2018  Review Complete On: 2018 By: Shana Caldera MD  
  
 Severity Noted Reaction Type Reactions Adhesive High 2013    Hives Mercurochrome [Merbromin] High 2013    Rash Merthiolate (Thimerosal) High 2013    Rash Nasacort [Triamcinolone Acetonide] High 2013    Other (comments)  
 congestion Pcn [Penicillins] Medium 2011    Rash, Other (comments) Azatadine Maleate  2011    Other (comments) Neomycin-bacitracin-polymyxin  2011    Other (comments) Current Immunizations  Reviewed on 2017 No immunizations on file. Not reviewed this visit You Were Diagnosed With   
  
 Codes Comments Typical atrial flutter (HCC)    -  Primary ICD-10-CM: I48.3 ICD-9-CM: 427.32 Vitals BP Pulse Height(growth percentile) Weight(growth percentile) BMI OB Status 140/86 65 5' 3\" (1.6 m) 115 lb (52.2 kg) 20.37 kg/m2 Postmenopausal  
 Smoking Status Former Smoker Vitals History BMI and BSA Data Body Mass Index Body Surface Area  
 20.37 kg/m 2 1.52 m 2 Preferred Pharmacy Pharmacy Name Phone [address], [address] 17 [facility] 22 [facility] [phone] Your Updated Medication List  
  
   
This list is accurate as of 5/4/18  9:29 AM.  Always use your most recent med list.  
  
  
  
  
 albuterol 90 mcg/actuation inhaler Commonly known as:  PROVENTIL HFA, VENTOLIN HFA, PROAIR HFA Take  by inhalation. calcium 500 mg Tab Take 500 mg by mouth. BID FLEXERIL 10 mg tablet Generic drug:  cyclobenzaprine Take 10 mg by mouth daily as needed. 1 tab at bedtime  Indications: FIBROMYALGIA  
  
 metoprolol succinate 25 mg XL tablet Commonly known as:  TOPROL-XL  
TAKE 1 TABLET BY MOUTH TWICE DAILY  
  
 multivitamin tablet Commonly known as:  ONE A DAY Take 1 Tab by mouth daily. OMEGA 3 PO Take  by mouth daily as needed. RESTASIS 0.05 % ophthalmic emulsion Generic drug:  cycloSPORINE Administer 1 drop to both eyes daily. We Performed the Following AMB POC EKG ROUTINE W/ 12 LEADS, INTER & REP [53073 CPT(R)] Introducing [facility] & HEALTH SERVICES! Sarah Villeda introduces FirstRain patient portal. Now you can access parts of your medical record, email your doctor's office, and request medication refills online. 1. In your internet browser, go to [email or web address] 2. Click on the First Time User? Click Here link in the Sign In box. You will see the New Member Sign Up page. 3. Enter your FirstRain Access Code exactly as it appears below. You will not need to use this code after youve completed the sign-up process. If you do not sign up before the expiration date, you must request a new code. · FirstRain Access Code: [email or web address] Expires: 7/24/2018  2:20 PM 
 
4.  Enter the last four digits of your Social Security Number (xxxx) and Date of Birth (mm/dd/yyyy) as indicated and click Submit. You will be taken to the next sign-up page. 5. Create a [employer] ID. This will be your [employer] login ID and cannot be changed, so think of one that is secure and easy to remember. 6. Create a [employer] password. You can change your password at any time. 7. Enter your Password Reset Question and Answer. This can be used at a later time if you forget your password. 8. Enter your e-mail address. You will receive e-mail notification when new information is available in [address] 9. Click Sign Up. You can now view and download portions of your medical record. 10. Click the Download Summary menu link to download a portable copy of your medical information. If you have questions, please visit the Frequently Asked Questions section of the [employer] website. Remember, [employer] is NOT to be used for urgent needs. For medical emergencies, dial 911. Now available from your iPhone and Android! Please provide this summary of care documentation to your next provider. Your primary care clinician is listed as Roslyn Booker. If you have any questions after today's visit, please call [phone].

## 2018-05-04 NOTE — PROGRESS NOTES
HISTORY OF PRESENT ILLNESS  Ronaldo Solano is a 67 y.o. female. Follow-up   Pertinent negatives include no chest pain, no abdominal pain, no headaches and no shortness of breath. Palpitations    Pertinent negatives include no fever, no chest pain, no claudication, no orthopnea, no PND, no abdominal pain, no nausea, no vomiting, no headaches, no dizziness, no cough and no shortness of breath. Patient presents for a follow-up office visit. The patient has a past medical history significant for supraventricular tachycardia. She was seen in the emergency room In November 2014 with acute onset palpitations and chest pain. Her initial EKG showed atrial flutter with a one-to-one AV conduction and heart rate of 240 beats per minute. Patient was treated with several rounds of adenosine and then diltiazem. The patient's heart rate improved to 130 beats were, however she remained in atrial flutter with borderline low pressure, so she underwent elective external cardioversion, which successfully converted her back to sinus rhythm with a single 50 J shock. The patient subsequently underwent an echocardiogram in November 2014 which showed a low-normal left ventricular ejection fraction 05-64%, grade 1 diastolic dysfunction, and no significant valvular heart disease. She was briefly on anticoagulation, but I was stopped because of her underlying liver disease. Patient also had an episode of what sounds like atrial fibrillation in September 2016 which converted with both IV Lopressor and IV diltiazem. The patient's most recent episode occurred in December 2016, the symptoms awoke her from sleep and lasted for several hours before seeking medical attention in the emergency department. In the emergency room, she was discovered to be in atrial flutter with 2-1 conduction and a heart rate of 135. She received a single dose of IV diltiazem which converted her to sinus rhythm in less than an hour.   She subsequently underwent a follow-up echocardiogram in December 2016 which was unchanged compared to her previous study. She then underwent a pharmacologic nuclear stress test which was a low risk study. Patient was last seen in the office approximately 6 months ago. Since last visit, she reports one episode of prolonged heart palpitations approximately 3 months ago which lasted close to 90 minutes. She states she took an extra short-acting metoprolol tartrate and this did improve her symptoms. She states she, felt fatigued the rest of the day, but the following day she is feeling back to her baseline. That was the only prolonged episode she has had since last visit. Otherwise she has been feeling well. No chest pain, shortness of breath, leg swelling, orthopnea, PND. Past Medical History:   Diagnosis Date    Abdominal pain     Autoimmune hepatitis ([facility] [address] 4-24-15    Dr Pilo Marie, s/p liver biopsy    Basal cell cancer     BPPV (benign paroxysmal positional vertigo)     Dr Vinod Maldonado Cardiac echocardiogram 11/11/2014    EF 50-55%. No WMA. Gr 1 DDfx. RVSP 26 mmHg.       Chronic lung disease     Degenerative disc disease, lumbar     Enlarged lymph nodes     celiac artery-follow up CT showed improvement in MAY 2015    Fracture     GERD (gastroesophageal reflux disease)     HCV (hepatitis C virus)     Hematuria     gross with xarelto-Dr Toribio Cuevas, Dr Jersey Borja: Oncology-Hematology-[facility]    Hemorrhage 1962 and 1963    Hepatitis C     treated    Hepatitis C 2002    Liver disease     Lumbar spondylosis with myelopathy     Ocular migraine     Osteoarthritis     Osteoporosis     Otosclerosis     Bilateral    Pinched nerve [id number]    C5 and C6    Scoliosis     Sjogren's syndrome ([facility] [address] 1999    SVT (supraventricular tachycardia) ([facility] [address] 10-28-14    Tachycardia     Transient global amnesia 9/4/07/, 4/12/12    x2    Wedging of vertebra (HCC)     Dry needling completed      Current Outpatient Prescriptions   Medication Sig Dispense Refill    albuterol (PROVENTIL HFA, VENTOLIN HFA, PROAIR HFA) 90 mcg/actuation inhaler Take  by inhalation.  metoprolol succinate (TOPROL-XL) 25 mg XL tablet TAKE 1 TABLET BY MOUTH TWICE DAILY 180 Tab 3    cyclobenzaprine (FLEXERIL) 10 mg tablet Take 10 mg by mouth daily as needed. 1 tab at bedtime  Indications: FIBROMYALGIA      calcium 500 mg Tab Take 500 mg by mouth. BID      multivitamin (ONE A DAY) tablet Take 1 Tab by mouth daily.  cycloSPORINE (RESTASIS) 0.05 % ophthalmic emulsion Administer 1 drop to both eyes daily.  FLAXSEED OIL (OMEGA 3 PO) Take  by mouth daily as needed. Allergies   Allergen Reactions    Adhesive Hives    Mercurochrome [Merbromin] Rash    Merthiolate (Thimerosal) Rash    Nasacort [Triamcinolone Acetonide] Other (comments)     congestion    Pcn [Penicillins] Rash and Other (comments)    Azatadine Maleate Other (comments)    Neomycin-Bacitracin-Polymyxin Other (comments)      Social History   Substance Use Topics    Smoking status: Former Smoker     Packs/day: 1.50     Years: 10.00     Types: Cigarettes     Quit date: 9/18/1973    Smokeless tobacco: Never Used    Alcohol use No                Review of Systems   Constitutional: Negative for chills, fever and weight loss. HENT: Negative for nosebleeds. Eyes: Negative for blurred vision and double vision. Respiratory: Negative for cough, shortness of breath and wheezing. Cardiovascular: Positive for palpitations. Negative for chest pain, orthopnea, claudication, leg swelling and PND. Gastrointestinal: Negative for abdominal pain, heartburn, nausea and vomiting. Genitourinary: Negative for dysuria and hematuria. Musculoskeletal: Negative for falls and myalgias. Skin: Negative for rash. Neurological: Negative for dizziness, focal weakness and headaches. Endo/Heme/Allergies: Does not bruise/bleed easily.    Psychiatric/Behavioral: Negative for substance abuse. Visit Vitals    /86    Pulse 65    Ht 5' 3\" (1.6 m)    Wt 52.2 kg (115 lb)    BMI 20.37 kg/m2      Physical Exam   Constitutional: She is oriented to person, place, and time. She appears well-developed and well-nourished. HENT:   Head: Normocephalic and atraumatic. Eyes: Conjunctivae are normal.   Neck: Neck supple. No JVD present. Carotid bruit is not present. Cardiovascular: Normal rate, regular rhythm, S1 normal, S2 normal and normal pulses. Exam reveals no gallop. No murmur heard. Pulmonary/Chest: Effort normal and breath sounds normal. She has no wheezes. She has no rales. Abdominal: Soft. Bowel sounds are normal. There is no tenderness. Musculoskeletal: She exhibits no edema. Neurological: She is alert and oriented to person, place, and time. Skin: Skin is warm and dry. EKG: Normal sinus rhythm, Borderline short MO interval, no delta waves, normal axis, Normal QTc interval, nonspecific T-wave flattening. Compared to the previous EKG, no significant interval change. ASSESSMENT and PLAN    Paroxysmal atrial flutter. Initial episode in 2014 when the patient initially presented with a heart rate of 240 bpm, likely representing one-to-one conduction. She underwent an electrical cardioversion in 2014. Another episode of atrial flutter occurred in December 2016 prompting an emergency room visit. The tachycardia broke after receiving Cardizem 10 mg intravenously. No recurrent palpitations since that episode. She is now taking a higher dose of metoprolol XL at 25 mg twice daily. She also uses an immediate release metoprolol to help with breakthrough symptoms. She is not on any anticoagulants or antiplatelet agents because of easy bruising and bleeding from her chronic liver disease. She had one episode of heart palpitations since last visit which lasted approximately 90 minutes.   I suspect this may have been either a recurrent episode of atrial flutter or another SVT. If her symptoms become more frequent, I would have a low threshold to schedule an electrophysiology study. Paroxysmal atrial fibrillation. This has been documented once in the past as well. Autoimmune hepatitis. Patient finished her treatment for this. At this point I still feel the risk of starting an anticoagulant outweighs any benefit. Total visit time was 25 minutes of which greater than 50% was face-to-face counseling    Followup in 6 months, sooner if needed.

## 2018-05-04 NOTE — PROGRESS NOTES
1. Have you been to the ER, urgent care clinic since your last visit? Hospitalized since your last visit? No    2. Have you seen or consulted any other health care providers outside of the [address] [place] [address] since your last visit? Include any pap smears or colon screening.  No

## 2018-07-18 NOTE — MR AVS SNAPSHOT
[facility] 
 
 
 [address] 
[phone] Patient: Candace Dow MRN: [id number] :1945 Visit Information Date & Time Provider Department Dept. Phone Encounter #  
 2018  8:00 AM Korin Ibarra MD [facility] [address] [place], [place] [address] [id number] Follow-up Instructions Return in about 5 months (around 12/3/2018). Your Appointments 2018  8:40 AM  
Follow Up with Maco Worthy MD  
[facility] \[facility] [place] [facility]\"" ([facility]) Appt Note: 6 mo f/u  
 [address] [facility] [address]  
[phone] [address] [place] [address].[address] Upcoming Health Maintenance Date Due COLONOSCOPY 2018 Influenza Age 5 to Adult 2018 MEDICARE YEARLY EXAM 2018 BREAST CANCER SCRN MAMMOGRAM 2018 GLAUCOMA SCREENING Q2Y 2019 DTaP/Tdap/Td series (2 - Td) 10/5/2026 Allergies as of 2018  Review Complete On: 2018 By: Korin Ibarra MD  
  
 Severity Noted Reaction Type Reactions Adhesive High 2013    Hives Mercurochrome [Merbromin] High 2013    Rash Merthiolate (Thimerosal) High 2013    Rash Nasacort [Triamcinolone Acetonide] High 2013    Other (comments)  
 congestion Pcn [Penicillins] Medium 2011    Rash, Other (comments) Azatadine Maleate  2011    Other (comments) Neomycin-bacitracin-polymyxin  2011    Other (comments) Current Immunizations  Reviewed on 2017 No immunizations on file. Not reviewed this visit You Were Diagnosed With   
  
 Codes Comments Autoimmune hepatitis ([facility] [address]    -  Primary ICD-10-CM: K75.4 ICD-9-CM: 571.42 Mixed connective tissue disease ([facility] [address]     ICD-10-CM: M35.1 ICD-9-CM: 710.8 Skin lesion     ICD-10-CM: L98.9 ICD-9-CM: 709.9 Typical atrial flutter (HCC)     ICD-10-CM: I48.3 ICD-9-CM: 427.32 Alopecia     ICD-10-CM: L65.9 ICD-9-CM: 704.00 Vitals BP Pulse Temp Resp Height(growth percentile) Weight(growth percentile) 109/56 68 97.2 °F (36.2 °C) 12 5' 3\" (1.6 m) 116 lb (52.6 kg) SpO2 BMI OB Status Smoking Status 100% 20.55 kg/m2 Postmenopausal Former Smoker Vitals History BMI and BSA Data Body Mass Index Body Surface Area 20.55 kg/m 2 1.53 m 2 Preferred Pharmacy Pharmacy Name Phone [address], Trey Kwon [address] [place] [address] 22 [phone] [facility] [phone] Your Updated Medication List  
  
   
This list is accurate as of 7/18/18  8:39 AM.  Always use your most recent med list.  
  
  
  
  
 albuterol 90 mcg/actuation inhaler Commonly known as:  PROVENTIL HFA, VENTOLIN HFA, PROAIR HFA Take  by inhalation. calcium 500 mg Tab Take 500 mg by mouth. BID FLEXERIL 10 mg tablet Generic drug:  cyclobenzaprine Take 10 mg by mouth daily as needed. 1 tab at bedtime  Indications: FIBROMYALGIA  
  
 metoprolol succinate 25 mg XL tablet Commonly known as:  TOPROL-XL  
TAKE 1 TABLET BY MOUTH TWICE DAILY  
  
 multivitamin tablet Commonly known as:  ONE A DAY Take 1 Tab by mouth daily. OMEGA 3 PO Take  by mouth daily as needed. RESTASIS 0.05 % ophthalmic emulsion Generic drug:  cycloSPORINE Administer 1 drop to both eyes daily. We Performed the Following REFERRAL TO DERMATOLOGY [REF19 Custom] Follow-up Instructions Return in about 5 months (around 12/3/2018). To-Do List   
 07/18/2018 Lab:  ARTHUR COMPREHENSIVE PANEL   
  
 07/18/2018 Lab:  C REACTIVE PROTEIN, QT   
  
 07/18/2018 Lab:  CBC WITH AUTOMATED DIFF   
  
 07/18/2018 Lab:  METABOLIC PANEL, COMPREHENSIVE   
  
 07/18/2018 Lab:  MICROALBUMIN, UR, RAND W/ MICROALB/CREAT RATIO Around 07/18/2018 Lab: TSH AND FREE T4 Referral Information Referral ID Referred By Referred To  
  
 [id number] Urmila Mujica Not Available Visits Status Start Date End Date 1 New Request 7/18/18 7/18/19 If your referral has a status of pending review or denied, additional information will be sent to support the outcome of this decision. Patient Instructions Pramipexole (By mouth) Pramipexole ([id number]-ole) Treats Parkinson disease and restless legs syndrome. Brand Name(s): Mirapex, Mirapex ER There may be other brand names for this medicine. When This Medicine Should Not Be Used: This medicine is not right for everyone. Do not use it if you had an allergic reaction to pramipexole. How to Use This Medicine:  
Tablet, Long Acting Tablet · Take your medicine as directed. Your dose may need to be changed several times to find what works best for you. · You may take this medicine with or without food. Taking this medicine with food may help reduce nausea. · Swallow the extended-release tablet whole. Do not crush, break, or chew it. · Read and follow the patient instructions that come with this medicine. Talk to your doctor or pharmacist if you have any questions. · Missed dose: ¨ Regular tablet: Skip the missed dose, and take your next dose as usual. Do not take extra medicine to make up for a missed dose. ¨ Extended-release tablet: If it is less than 12 hours since your missed dose, take the missed dose as soon as you remember and take your next dose at the normal time. If it is more than 12 hours since your missed dose, skip the missed dose and take your next dose at the normal time. · Store the medicine in a closed container at room temperature, away from heat, moisture, and direct light. Drugs and Foods to Avoid: Ask your doctor or pharmacist before using any other medicine, including over-the-counter medicines, vitamins, and herbal products. · Some medicines can affect how pramipexole works. Tell your doctor if you are using the following:  
¨ amantadine, cimetidine, diltiazem, levodopa, metoclopramide, quinidine, quinine, ranitidine, thiothixene, triamterene, or verapamil ¨ a phenothiazine medicine (such as chlorpromazine, perphenazine, promethazine, prochlorperazine, thioridazine) · Tell your doctor if you use anything else that makes you sleepy. Some examples are allergy medicine, narcotic pain medicine, and alcohol. · Tell your doctor if you are using any medicine that makes you sleepy, such as allergy medicine or narcotic pain medicine. Warnings While Using This Medicine: · Tell your doctor if you are pregnant or breastfeeding, or if you have kidney disease, low blood pressure, a sleep disorder, or dyskinesia (trouble with muscle control). · This medicine may cause the following problems: 
¨ Low blood pressure ¨ Hallucinations ¨ Urges to grace, spend money, binge eat, have sex, or engage in other compulsive behaviors ¨ An increased risk for skin cancer · This medicine may make you dizzy or drowsy. It may even cause you to fall asleep without warning while you drive, talk, or eat. Do not drive or do anything that could be dangerous until you know how this medicine affects you. Stand up slowly if you feel dizzy or lightheaded. · Do not stop using this medicine suddenly. Your doctor will need to slowly decrease your dose before you stop it completely. · Your doctor will check your progress and the effects of this medicine at regular visits. Keep all appointments. · Keep all medicine out of the reach of children. Never share your medicine with anyone. Possible Side Effects While Using This Medicine:  
Call your doctor right away if you notice any of these side effects: · Allergic reaction: Itching or hives, swelling in your face or hands, swelling or tingling in your mouth or throat, chest tightness, trouble breathing · Change in how much or how often you urinate, or painful urination · Changes in vision · Chest pain or trouble breathing · Extreme sleepiness or drowsiness · Lightheadedness, dizziness, or fainting · Muscle pain, stiffness, tenderness, or weakness · Seeing, hearing, or feeling things that are not really there · Twitching or muscle movements you cannot control, tremors, problems with balance or walking If you notice these less serious side effects, talk with your doctor: · Constipation or nausea · Dry mouth 
· Headache, confusion, memory problems, or unusual behavior · Unusual dreams If you notice other side effects that you think are caused by this medicine, tell your doctor. Call your doctor for medical advice about side effects. You may report side effects to FDA at 9-437-QFE-0150 © 2017 [facility] Information is for End User's use only and may not be sold, redistributed or otherwise used for commercial purposes. The above information is an  only. It is not intended as medical advice for individual conditions or treatments. Talk to your doctor, nurse or pharmacist before following any medical regimen to see if it is safe and effective for you. Introducing [facility]! [facility] introduces HotDesk patient portal. Now you can access parts of your medical record, email your doctor's office, and request medication refills online. 1. In your internet browser, go to [email or web address]. [email or web address] 2. Click on the First Time User? Click Here link in the Sign In box. You will see the New Member Sign Up page. 3. Enter your HotDesk Access Code exactly as it appears below. You will not need to use this code after youve completed the sign-up process. If you do not sign up before the expiration date, you must request a new code. · HotDesk Access Code: [email or web address] Expires: 7/24/2018  2:20 PM 
 
 4. Enter the last four digits of your Social Security Number (xxxx) and Date of Birth (mm/dd/yyyy) as indicated and click Submit. You will be taken to the next sign-up page. 5. Create a Innohat ID. This will be your Innohat login ID and cannot be changed, so think of one that is secure and easy to remember. 6. Create a Innohat password. You can change your password at any time. 7. Enter your Password Reset Question and Answer. This can be used at a later time if you forget your password. 8. Enter your e-mail address. You will receive e-mail notification when new information is available in [address] 9. Click Sign Up. You can now view and download portions of your medical record. 10. Click the Download Summary menu link to download a portable copy of your medical information. If you have questions, please visit the Frequently Asked Questions section of the Innohat website. Remember, Innohat is NOT to be used for urgent needs. For medical emergencies, dial 911. Now available from your iPhone and Android! Please provide this summary of care documentation to your next provider. Your primary care clinician is listed as Ivory Barrier. If you have any questions after today's visit, please call [phone].

## 2018-07-18 NOTE — PROGRESS NOTES
1. Have you been to the ER, urgent care clinic since your last visit? Hospitalized since your last visit? No    2. Have you seen or consulted any other health care providers outside of the [facility] since your last visit? Include any pap smears or colon screening.  Yes Reason for visit: cardiology

## 2018-07-18 NOTE — PATIENT INSTRUCTIONS
Pramipexole (By mouth)   Pramipexole ([id number]-ole)  Treats Parkinson disease and restless legs syndrome. Brand Name(s): Mirapex, Mirapex ER   There may be other brand names for this medicine. When This Medicine Should Not Be Used: This medicine is not right for everyone. Do not use it if you had an allergic reaction to pramipexole. How to Use This Medicine:   Tablet, Long Acting Tablet  · Take your medicine as directed. Your dose may need to be changed several times to find what works best for you. · You may take this medicine with or without food. Taking this medicine with food may help reduce nausea. · Swallow the extended-release tablet whole. Do not crush, break, or chew it. · Read and follow the patient instructions that come with this medicine. Talk to your doctor or pharmacist if you have any questions. · Missed dose:   ¨ Regular tablet: Skip the missed dose, and take your next dose as usual. Do not take extra medicine to make up for a missed dose. ¨ Extended-release tablet: If it is less than 12 hours since your missed dose, take the missed dose as soon as you remember and take your next dose at the normal time. If it is more than 12 hours since your missed dose, skip the missed dose and take your next dose at the normal time. · Store the medicine in a closed container at room temperature, away from heat, moisture, and direct light. Drugs and Foods to Avoid:   Ask your doctor or pharmacist before using any other medicine, including over-the-counter medicines, vitamins, and herbal products. · Some medicines can affect how pramipexole works.  Tell your doctor if you are using the following:   ¨ amantadine, cimetidine, diltiazem, levodopa, metoclopramide, quinidine, quinine, ranitidine, thiothixene, triamterene, or verapamil  ¨ a phenothiazine medicine (such as chlorpromazine, perphenazine, promethazine, prochlorperazine, thioridazine)  · Tell your doctor if you use anything else that makes you sleepy. Some examples are allergy medicine, narcotic pain medicine, and alcohol. · Tell your doctor if you are using any medicine that makes you sleepy, such as allergy medicine or narcotic pain medicine. Warnings While Using This Medicine:   · Tell your doctor if you are pregnant or breastfeeding, or if you have kidney disease, low blood pressure, a sleep disorder, or dyskinesia (trouble with muscle control). · This medicine may cause the following problems:  ¨ Low blood pressure  ¨ Hallucinations  ¨ Urges to grace, spend money, binge eat, have sex, or engage in other compulsive behaviors  ¨ An increased risk for skin cancer  · This medicine may make you dizzy or drowsy. It may even cause you to fall asleep without warning while you drive, talk, or eat. Do not drive or do anything that could be dangerous until you know how this medicine affects you. Stand up slowly if you feel dizzy or lightheaded. · Do not stop using this medicine suddenly. Your doctor will need to slowly decrease your dose before you stop it completely. · Your doctor will check your progress and the effects of this medicine at regular visits. Keep all appointments. · Keep all medicine out of the reach of children. Never share your medicine with anyone.   Possible Side Effects While Using This Medicine:   Call your doctor right away if you notice any of these side effects:  · Allergic reaction: Itching or hives, swelling in your face or hands, swelling or tingling in your mouth or throat, chest tightness, trouble breathing  · Change in how much or how often you urinate, or painful urination  · Changes in vision  · Chest pain or trouble breathing  · Extreme sleepiness or drowsiness  · Lightheadedness, dizziness, or fainting  · Muscle pain, stiffness, tenderness, or weakness  · Seeing, hearing, or feeling things that are not really there  · Twitching or muscle movements you cannot control, tremors, problems with balance or walking  If you notice these less serious side effects, talk with your doctor:   · Constipation or nausea  · Dry mouth  · Headache, confusion, memory problems, or unusual behavior  · Unusual dreams  If you notice other side effects that you think are caused by this medicine, tell your doctor. Call your doctor for medical advice about side effects. You may report side effects to FDA at 5-239-FDA-2277  © 2017 [employer]  Information is for End User's use only and may not be sold, redistributed or otherwise used for commercial purposes. The above information is an  only. It is not intended as medical advice for individual conditions or treatments. Talk to your doctor, nurse or pharmacist before following any medical regimen to see if it is safe and effective for you.

## 2018-07-18 NOTE — PROGRESS NOTES
[facility]:  7/18/2018, MRN: [id number]      Marcos Grijalva is a 67 y.o. female and presents to clinic for Skin Problem (referral to derm lesions on arms and neck); Alopecia; and Leg Pain (restless legs)    Subjective:   Pt is a 67yo female with h/o sjogren's syndrome per EHR, positive RNP Ab,  autoimmune hepatitis, hepatitis C (from a contaminated blood transfusion) s/p interferon/ribavirin with f/u subsequent negative viral loads, hydronephrosis, autoimmune hepatitis, osteoporosis, hematuria, and atrial flutter. 1. Skin Lesions: She has a h/o basal cell carcinoma (on her back) s/p removal. She noticed 4 wks ago, 2 areas along her left forearm and along her left neck. These areas are associated with dryness/pruritus. The area along her left forearm seems to be enlarging per her hx. The area along her left neck is not changing in appearance; she noticed this area about a wk ago. She is using sunscreen. 2. Autoimmune Hepatitis: Stable. Diagnosed >1 yr ago. Per GI, she is to RTC on an as needed basis. They prefer per her hx for me to check her LFTs once a year for now on.    3. Alopecia and Mixed Connective Tissue Disease: She lost a lot of hair beginning 2 months ago along her temples. She sees her Rheumatologist later this month. +Increased hair shedding. +H/o Sjogren's disease and RNP Ab. 5. RLS: Present off/on for yrs. Treated with Vics Vapo Rub. The latter helps to resolve her sx. It is becoming more and more frequent in occurrence. She had no improvement with ibuprofen and benadryl. She tries to stretch her legs to help. She has sx on avg once a wk. Sx include a propensity/desire to move her legs. 6.  Atrial flutter: Followed by the cardiology team.  Blood pressure is 109/56. She takes metoprolol. She is presently asymptomatic.         Patient Active Problem List    Diagnosis Date Noted    Positive hepatitis C antibody test 12/04/2017    Mixed connective tissue disease ([facility] 11/20/2017    Osteoporosis 07/19/2017    Sjogren's syndrome ([facility] 10/05/2016    Autoimmune hepatitis ([facility] 07/13/2015    History of hematuria 11/21/2014    Atrial flutter (HCC) 10/28/2014       Current Outpatient Prescriptions   Medication Sig Dispense Refill    albuterol (PROVENTIL HFA, VENTOLIN HFA, PROAIR HFA) 90 mcg/actuation inhaler Take  by inhalation.  metoprolol succinate (TOPROL-XL) 25 mg XL tablet TAKE 1 TABLET BY MOUTH TWICE DAILY 180 Tab 3    cyclobenzaprine (FLEXERIL) 10 mg tablet Take 10 mg by mouth daily as needed. 1 tab at bedtime  Indications: FIBROMYALGIA      calcium 500 mg Tab Take 500 mg by mouth. BID      multivitamin (ONE A DAY) tablet Take 1 Tab by mouth daily.  cycloSPORINE (RESTASIS) 0.05 % ophthalmic emulsion Administer 1 drop to both eyes daily.  FLAXSEED OIL (OMEGA 3 PO) Take  by mouth daily as needed. Allergies   Allergen Reactions    Adhesive Hives    Mercurochrome [Merbromin] Rash    Merthiolate (Thimerosal) Rash    Nasacort [Triamcinolone Acetonide] Other (comments)     congestion    Pcn [Penicillins] Rash and Other (comments)    Azatadine Maleate Other (comments)    Neomycin-Bacitracin-Polymyxin Other (comments)       Past Medical History:   Diagnosis Date    Abdominal pain     Autoimmune hepatitis ([facility] 4-24-15    Dr González Fernandes, s/p liver biopsy    Basal cell cancer     BPPV (benign paroxysmal positional vertigo)     Dr Shakila Sanchez Cardiac echocardiogram 11/11/2014    EF 50-55%. No WMA. Gr 1 DDfx. RVSP 26 mmHg.       Chronic lung disease     Degenerative disc disease, lumbar     Enlarged lymph nodes     celiac artery-follow up CT showed improvement in MAY 2015    Fracture     GERD (gastroesophageal reflux disease)     HCV (hepatitis C virus)     Hematuria     gross with xarelto-Dr Marlon Sanford, Dr Deisi Bradley: Oncology-Hematology-[facility]    Hemorrhage 1962 and 1963    Hepatitis C     treated    Hepatitis C 2002    Liver disease     Lumbar spondylosis with myelopathy     Ocular migraine     Osteoarthritis     Osteoporosis     Otosclerosis     Bilateral    Pinched nerve [id number]    C5 and C6    Scoliosis     Sjogren's syndrome ([facility] 1999    SVT (supraventricular tachycardia) ([facility] 10-28-14    Tachycardia     Transient global amnesia 9/4/07/, 4/12/12    x2    Wedging of vertebra ([facility]     Dry needling completed       Past Surgical History:   Procedure Laterality Date    HX ADENOIDECTOMY      HX APPENDECTOMY      HX COLONOSCOPY  9/2013    HX CYST INCISION AND DRAINAGE Bilateral 1973    HX TONSILLECTOMY         Family History   Problem Relation Age of Onset    Cancer Mother 76     stomach    Stroke Maternal Grandmother     Heart Disease Brother     Cancer Brother      Lyphoma    Stroke Father     Arthritis-osteo Sister     Osteoporosis Sister     No Known Problems Maternal Grandfather     Osteoporosis Paternal Grandmother     Osteoporosis Paternal Grandfather     Diabetes Brother        Social History   Substance Use Topics    Smoking status: Former Smoker     Packs/day: 1.50     Years: 10.00     Types: Cigarettes     Quit date: 9/18/1973    Smokeless tobacco: Never Used    Alcohol use No       ROS   Review of Systems   Constitutional: Negative for chills and fever. HENT: Negative for ear pain and sore throat. Eyes: Negative for blurred vision and pain. Respiratory: Negative for cough and shortness of breath. Cardiovascular: Negative for chest pain. Gastrointestinal: Negative for abdominal pain, blood in stool and melena. Genitourinary: Negative for dysuria and hematuria. Musculoskeletal: Negative for joint pain and myalgias. Skin: Positive for itching. Neurological: Negative for tingling, focal weakness and headaches. Endo/Heme/Allergies: Does not bruise/bleed easily. Psychiatric/Behavioral: Negative for substance abuse.        Objective     Vitals:    07/18/18 0754   BP: 109/56   Pulse: 68 Resp: 12   Temp: 97.2 °F (36.2 °C)   SpO2: 100%   Weight: 116 lb (52.6 kg)   Height: 5' 3\" (1.6 m)   PainSc:   0 - No pain       Physical Exam   Constitutional: She is oriented to person, place, and time and well-developed, well-nourished, and in no distress. HENT:   Head: Normocephalic and atraumatic. Right Ear: External ear normal.   Left Ear: External ear normal.   Nose: Nose normal.   Mouth/Throat: Oropharynx is clear and moist. No oropharyngeal exudate. Eyes: Conjunctivae and EOM are normal. Pupils are equal, round, and reactive to light. Right eye exhibits no discharge. Left eye exhibits no discharge. No scleral icterus. Neck: Neck supple. Cardiovascular: Normal rate, regular rhythm, normal heart sounds and intact distal pulses. Exam reveals no gallop and no friction rub. No murmur heard. Pulmonary/Chest: Effort normal and breath sounds normal. No respiratory distress. She has no wheezes. She has no rales. Abdominal: Soft. Bowel sounds are normal. She exhibits no distension. There is no tenderness. There is no rebound and no guarding. No organomegaly   Musculoskeletal: She exhibits no edema or tenderness (bue). Lymphadenopathy:     She has no cervical adenopathy. Neurological: She is alert and oriented to person, place, and time. She exhibits normal muscle tone. Gait normal.   Skin: Skin is warm and dry. She has 2 scaly circular flat lesions slightly erythematous and nontender to palpation along her left forearm ulnar aspect. There is a flat homogeneously colored not to prolong the left side of her neck. She has significant for her hair along bilateral temple areas of her scalp   Psychiatric: Affect normal.   Nursing note and vitals reviewed.       LABS   Data Review:   Lab Results   Component Value Date/Time    WBC 4.6 11/17/2017 10:45 AM    HGB 13.1 11/17/2017 10:45 AM    HCT 40.6 11/17/2017 10:45 AM    PLATELET 901 68/40/1639 10:45 AM    MCV 92.7 11/17/2017 10:45 AM       Lab Results   Component Value Date/Time    Sodium 139 05/21/2018 10:33 AM    Potassium 4.5 05/21/2018 10:33 AM    Chloride 102 05/21/2018 10:33 AM    CO2 31 05/21/2018 10:33 AM    Anion gap 6 05/21/2018 10:33 AM    Glucose 78 05/21/2018 10:33 AM    BUN 13 05/21/2018 10:33 AM    Creatinine 0.76 05/21/2018 10:33 AM    BUN/Creatinine ratio 17 05/21/2018 10:33 AM    GFR est AA >60 05/21/2018 10:33 AM    GFR est non-AA >60 05/21/2018 10:33 AM    Calcium 9.2 05/21/2018 10:33 AM       Lab Results   Component Value Date/Time    Cholesterol, total 185 11/30/2016 08:30 AM    HDL Cholesterol 74 (H) 11/30/2016 08:30 AM    LDL, calculated 92.6 11/30/2016 08:30 AM    VLDL, calculated 18.4 11/30/2016 08:30 AM    Triglyceride 92 11/30/2016 08:30 AM    CHOL/HDL Ratio 2.5 11/30/2016 08:30 AM     Assessment/Plan:   1. Autoimmune hepatitis Hx: In remission. Checking a CMP, CBC, CRP, ARTHUR, TFTs. ORDERS:  - CBC WITH AUTOMATED DIFF; Future  - METABOLIC PANEL, COMPREHENSIVE; Future  - C REACTIVE PROTEIN, QT; Future  - ARTHUR COMPREHENSIVE PANEL; Future  - TSH AND FREE T4; Future    2. Mixed connective tissue disease:   Checking a CBC, CMP, CRP, ARTHUR, and TFTs. I encouraged her to follow-up with dermatology team later this month. ORDERS:  - CBC WITH AUTOMATED DIFF; Future  - METABOLIC PANEL, COMPREHENSIVE; Future  - C REACTIVE PROTEIN, QT; Future  - ARTHUR COMPREHENSIVE PANEL; Future  - TSH AND FREE T4; Future    3. Skin lesions: Along left neck (likely benign) and along her left forearm (concerning per her hx and per my PE today). Her alopecia could be 2/2 #2. Telogen effluvium (she had influenza in April). Placing a referral to Dermatology. Checking TFTs. ORDERS:  - REFERRAL TO DERMATOLOGY    4. Typical atrial flutter: Stable. Continue to follow with cardiology team.  Elke Monroe a CMP, urine protein screen, and TFTs. Continue with medication as prescribed. ORDERS:  - METABOLIC PANEL, COMPREHENSIVE;  Future  - MICROALBUMIN, UR, RAND W/ MICROALB/CREAT RATIO; Future  - TSH AND FREE T4; Future    5. RLS (restless legs syndrome):   The patient declined pharmacotherapy options. We discussed pramipexole as an option. She will let me know if she is interested in a trial of pramipexole. She given a handout today on this medication. All questions were answered. She also declined a referral to a specialist for additional recommendations. Health Maintenance Due   Topic Date Due    COLONOSCOPY  09/24/2018     Lab review: labs are reviewed in the EHR    I have discussed the diagnosis with the patient and the intended plan as seen in the above orders. The patient has received an after-visit summary and questions were answered concerning future plans. I have discussed medication side effects and warnings with the patient as well. I have reviewed the plan of care with the patient, accepted their input and they are in agreement with the treatment goals. All questions were answered. The patient understands the plan of care. Handouts provided today with above information. Pt instructed if symptoms worsen to call the office or report to the ED for continued care. Greater than 50% of the visit time was spent in counseling and/or coordination of care. Voice recognition was used to generate this report, which may have resulted in some phonetic based errors in grammar and contents. Even though attempts were made to correct all the mistakes, some may have been missed, and remained in the body of the document. Follow-up Disposition:  Return in about 5 months (around 12/3/2018).     Evie Escobedo MD

## 2018-07-20 NOTE — PROGRESS NOTES
Please let her know that her labs do not show evidence of active sjogren's disease and mixed connective tissue disease. Please fax all of her results to her Rheumatology team. Her inflammatory marker (CRP) is normal. Her thyroid function and kidney function are normal. One of her liver blood tests (AST) is mildly elevated. Her white blood cell count is mildly low but unchanged when compared to what it was a year ago. She is not anemic. Her platelet count is normal. I will recheck her liver tests in 4 weeks given her h/o autoimmune hepatitis. If the results are normal, no additional studies are warranted. Please schedule her for labs in 4 wks.     Dr. Nurys Booth  [facility], [address] [place] [address], [address] [place] [address] Str.  Phone: [phone]  Fax: [phone]

## 2018-08-06 NOTE — LETTER
8/7/2018 11:01 AM 
 
Ms. Mame Armando [address] [facility] [address] [facility] [id number] Dear Ms. Mame Armando, 
 
I have been unable to reach you by phone, so I am mailing to you today your recent Lab Results per Dr Madison Dunn. Please take the Lab results to your Rheumatology team for their review, and call us with any further questions and or concerns. Have a great day. Sincerely, Teressa Davila. Vivienne Hernandez MA for Dr Rafaela Srivastava

## 2018-08-06 NOTE — TELEPHONE ENCOUNTER
Chief Complaint   Patient presents with    Labs     done 7-18-18 per Dr Les De Leon     Please let her know that her labs do not show evidence of active sjogren's disease and mixed connective tissue disease. Please fax all of her results to her Rheumatology team. Her inflammatory marker (CRP) is normal. Her thyroid function and kidney function are normal. One of her liver blood tests (AST) is mildly elevated. Her white blood cell count is mildly low but unchanged when compared to what it was a year ago. She is not anemic. Her platelet count is normal. I will recheck her liver tests in 4 weeks given her h/o autoimmune hepatitis. If the results are normal, no additional studies are warranted. Please schedule her for labs in 4 wks. Left voice message to call back and discuss. Unable to reach the patient by phone, so I have mailed the patient her lab results, and sent a Letter to her.   The patient will be able to deliver the Lab results to her Rheumatology Team.